# Patient Record
Sex: MALE | Race: WHITE | NOT HISPANIC OR LATINO | Employment: FULL TIME | URBAN - METROPOLITAN AREA
[De-identification: names, ages, dates, MRNs, and addresses within clinical notes are randomized per-mention and may not be internally consistent; named-entity substitution may affect disease eponyms.]

---

## 2023-10-03 ENCOUNTER — ANCILLARY PROCEDURE (OUTPATIENT)
Dept: RADIOLOGY | Facility: CLINIC | Age: 43
End: 2023-10-03
Payer: COMMERCIAL

## 2023-10-03 DIAGNOSIS — R94.31 ABNORMAL ELECTROCARDIOGRAM (ECG) (EKG): ICD-10-CM

## 2023-10-03 PROCEDURE — 75571 CT HRT W/O DYE W/CA TEST: CPT

## 2023-10-06 PROBLEM — E78.5 HLD (HYPERLIPIDEMIA): Status: ACTIVE | Noted: 2023-10-06

## 2023-10-06 PROBLEM — G47.30 SLEEP APNEA: Status: ACTIVE | Noted: 2023-10-06

## 2023-10-06 PROBLEM — L60.3 ONYCHODYSTROPHY: Status: ACTIVE | Noted: 2023-10-06

## 2023-10-06 PROBLEM — D22.62 MELANOCYTIC NEVI OF LEFT UPPER LIMB, INCLUDING SHOULDER: Status: ACTIVE | Noted: 2023-01-23

## 2023-10-06 PROBLEM — N20.0 NEPHROLITHIASIS: Status: ACTIVE | Noted: 2023-10-06

## 2023-10-06 PROBLEM — D22.70 MELANOCYTIC NEVI OF UNSPECIFIED LOWER LIMB, INCLUDING HIP: Status: ACTIVE | Noted: 2023-01-23

## 2023-10-06 PROBLEM — H91.90 HEARING LOSS: Status: ACTIVE | Noted: 2023-10-06

## 2023-10-06 PROBLEM — R43.8 HYPOSMIA: Status: ACTIVE | Noted: 2023-10-06

## 2023-10-06 PROBLEM — J34.829 NASAL VALVE COLLAPSE: Status: ACTIVE | Noted: 2023-10-06

## 2023-10-06 PROBLEM — L91.8 OTHER HYPERTROPHIC DISORDERS OF THE SKIN: Status: ACTIVE | Noted: 2023-01-23

## 2023-10-06 PROBLEM — R29.898 HEAVINESS OF UPPER EXTREMITY: Status: ACTIVE | Noted: 2023-10-06

## 2023-10-06 PROBLEM — F41.9 ANXIETY AND DEPRESSION: Status: ACTIVE | Noted: 2023-10-06

## 2023-10-06 PROBLEM — J34.2 NASAL SEPTAL DEVIATION: Status: ACTIVE | Noted: 2023-10-06

## 2023-10-06 PROBLEM — R03.0 ELEVATED BLOOD PRESSURE READING: Status: ACTIVE | Noted: 2023-10-06

## 2023-10-06 PROBLEM — J34.3 NASAL TURBINATE HYPERTROPHY: Status: ACTIVE | Noted: 2023-10-06

## 2023-10-06 PROBLEM — L21.9 SEBORRHEIC DERMATITIS, UNSPECIFIED: Status: ACTIVE | Noted: 2023-01-23

## 2023-10-06 PROBLEM — L82.1 OTHER SEBORRHEIC KERATOSIS: Status: ACTIVE | Noted: 2023-01-23

## 2023-10-06 PROBLEM — D48.5 NEOPLASM OF UNCERTAIN BEHAVIOR OF SKIN: Status: ACTIVE | Noted: 2023-01-23

## 2023-10-06 PROBLEM — R00.0 TACHYCARDIA: Status: ACTIVE | Noted: 2023-10-06

## 2023-10-06 PROBLEM — L90.5 SCAR CONDITION AND FIBROSIS OF SKIN: Status: ACTIVE | Noted: 2023-01-23

## 2023-10-06 PROBLEM — L81.4 OTHER MELANIN HYPERPIGMENTATION: Status: ACTIVE | Noted: 2023-01-23

## 2023-10-06 PROBLEM — E66.9 CLASS 1 OBESITY WITH BODY MASS INDEX (BMI) OF 30.0 TO 30.9 IN ADULT: Status: ACTIVE | Noted: 2023-10-06

## 2023-10-06 PROBLEM — D18.01 HEMANGIOMA OF SKIN AND SUBCUTANEOUS TISSUE: Status: ACTIVE | Noted: 2023-01-23

## 2023-10-06 PROBLEM — R60.0: Status: ACTIVE | Noted: 2023-10-06

## 2023-10-06 PROBLEM — E55.9 VITAMIN D DEFICIENCY: Status: ACTIVE | Noted: 2023-10-06

## 2023-10-06 PROBLEM — E66.811 CLASS 1 OBESITY WITH BODY MASS INDEX (BMI) OF 30.0 TO 30.9 IN ADULT: Status: ACTIVE | Noted: 2023-10-06

## 2023-10-06 PROBLEM — R22.9 SKIN NODULE: Status: ACTIVE | Noted: 2023-10-06

## 2023-10-06 PROBLEM — R41.3 MEMORY LOSS: Status: ACTIVE | Noted: 2023-10-06

## 2023-10-06 PROBLEM — F43.23 ADJUSTMENT DISORDER WITH MIXED ANXIETY AND DEPRESSED MOOD: Status: ACTIVE | Noted: 2023-10-06

## 2023-10-06 PROBLEM — E29.1 MALE HYPOGONADISM: Status: ACTIVE | Noted: 2023-10-06

## 2023-10-06 PROBLEM — M95.0 NASAL VALVE COLLAPSE: Status: ACTIVE | Noted: 2023-10-06

## 2023-10-06 PROBLEM — D22.5 MELANOCYTIC NEVI OF TRUNK: Status: ACTIVE | Noted: 2023-01-23

## 2023-10-06 PROBLEM — F32.A ANXIETY AND DEPRESSION: Status: ACTIVE | Noted: 2023-10-06

## 2023-10-06 PROBLEM — L73.8 OTHER SPECIFIED FOLLICULAR DISORDERS: Status: ACTIVE | Noted: 2023-01-23

## 2023-10-06 PROBLEM — G47.00 INSOMNIA: Status: ACTIVE | Noted: 2023-10-06

## 2023-10-06 RX ORDER — LORAZEPAM 1 MG/1
1 TABLET ORAL 2 TIMES DAILY PRN
COMMUNITY
End: 2023-11-21 | Stop reason: SDUPTHER

## 2023-10-06 RX ORDER — NORTRIPTYLINE HYDROCHLORIDE 25 MG/1
1 CAPSULE ORAL DAILY
COMMUNITY
Start: 2021-03-01 | End: 2023-11-21 | Stop reason: WASHOUT

## 2023-10-06 RX ORDER — HYDROXYZINE PAMOATE 25 MG/1
CAPSULE ORAL
COMMUNITY
Start: 2022-10-11 | End: 2023-11-21 | Stop reason: WASHOUT

## 2023-10-06 RX ORDER — FLUOCINONIDE 0.5 MG/G
CREAM TOPICAL
COMMUNITY
Start: 2021-10-12

## 2023-10-06 RX ORDER — KETOCONAZOLE 20 MG/G
CREAM TOPICAL
COMMUNITY
Start: 2021-07-12

## 2023-10-06 RX ORDER — SERTRALINE HYDROCHLORIDE 50 MG/1
2 TABLET, FILM COATED ORAL DAILY
COMMUNITY
Start: 2022-10-11

## 2023-10-06 RX ORDER — NORTRIPTYLINE HYDROCHLORIDE 10 MG/1
CAPSULE ORAL
COMMUNITY
Start: 2023-06-19

## 2023-10-06 RX ORDER — HYDROXYZINE HYDROCHLORIDE 25 MG/1
TABLET, FILM COATED ORAL
COMMUNITY
Start: 2023-06-19

## 2023-10-06 RX ORDER — FLUTICASONE PROPIONATE 50 MCG
2 SPRAY, SUSPENSION (ML) NASAL DAILY
COMMUNITY
Start: 2021-08-26

## 2023-10-06 RX ORDER — TIZANIDINE 4 MG/1
TABLET ORAL
COMMUNITY

## 2023-10-06 RX ORDER — CLINDAMYCIN PHOSPHATE 10 UG/ML
LOTION TOPICAL
COMMUNITY
Start: 2023-01-23

## 2023-10-06 RX ORDER — ASPIRIN 325 MG
TABLET, DELAYED RELEASE (ENTERIC COATED) ORAL
COMMUNITY
Start: 2021-05-05

## 2023-10-09 ENCOUNTER — OFFICE VISIT (OUTPATIENT)
Dept: CARDIOLOGY | Facility: CLINIC | Age: 43
End: 2023-10-09
Payer: COMMERCIAL

## 2023-10-09 ENCOUNTER — LAB (OUTPATIENT)
Dept: LAB | Facility: LAB | Age: 43
End: 2023-10-09
Payer: COMMERCIAL

## 2023-10-09 VITALS
TEMPERATURE: 96.4 F | HEIGHT: 69 IN | WEIGHT: 208 LBS | SYSTOLIC BLOOD PRESSURE: 138 MMHG | DIASTOLIC BLOOD PRESSURE: 88 MMHG | HEART RATE: 75 BPM | BODY MASS INDEX: 30.81 KG/M2

## 2023-10-09 DIAGNOSIS — E78.5 HYPERLIPIDEMIA, UNSPECIFIED: ICD-10-CM

## 2023-10-09 DIAGNOSIS — E66.09 CLASS 1 OBESITY DUE TO EXCESS CALORIES WITH BODY MASS INDEX (BMI) OF 30.0 TO 30.9 IN ADULT, UNSPECIFIED WHETHER SERIOUS COMORBIDITY PRESENT: ICD-10-CM

## 2023-10-09 DIAGNOSIS — Z78.9 NONSMOKER: ICD-10-CM

## 2023-10-09 DIAGNOSIS — F41.9 ANXIETY DISORDER, UNSPECIFIED: ICD-10-CM

## 2023-10-09 DIAGNOSIS — R03.0 BORDERLINE HYPERTENSION: ICD-10-CM

## 2023-10-09 DIAGNOSIS — E78.00 ELEVATED LDL CHOLESTEROL LEVEL: Primary | ICD-10-CM

## 2023-10-09 DIAGNOSIS — E66.9 OBESITY, UNSPECIFIED: ICD-10-CM

## 2023-10-09 DIAGNOSIS — R00.0 TACHYCARDIA: ICD-10-CM

## 2023-10-09 DIAGNOSIS — E78.2 MIXED HYPERLIPIDEMIA: Primary | ICD-10-CM

## 2023-10-09 DIAGNOSIS — R93.1 AGATSTON CAC SCORE 200-399: ICD-10-CM

## 2023-10-09 DIAGNOSIS — Z00.00 ENCOUNTER FOR GENERAL ADULT MEDICAL EXAMINATION WITHOUT ABNORMAL FINDINGS: Primary | ICD-10-CM

## 2023-10-09 LAB
ALBUMIN SERPL BCP-MCNC: 4.6 G/DL (ref 3.4–5)
ALP SERPL-CCNC: 72 U/L (ref 33–120)
ALT SERPL W P-5'-P-CCNC: 31 U/L (ref 10–52)
ANION GAP SERPL CALC-SCNC: 11 MMOL/L (ref 10–20)
AST SERPL W P-5'-P-CCNC: 25 U/L (ref 9–39)
BASOPHILS # BLD AUTO: 0.03 X10*3/UL (ref 0–0.1)
BASOPHILS NFR BLD AUTO: 0.4 %
BILIRUB SERPL-MCNC: 0.8 MG/DL (ref 0–1.2)
BUN SERPL-MCNC: 19 MG/DL (ref 6–23)
CALCIUM SERPL-MCNC: 9.7 MG/DL (ref 8.6–10.3)
CHLORIDE SERPL-SCNC: 103 MMOL/L (ref 98–107)
CHOLEST SERPL-MCNC: 247 MG/DL (ref 0–199)
CHOLESTEROL/HDL RATIO: 6.6
CO2 SERPL-SCNC: 30 MMOL/L (ref 21–32)
CREAT SERPL-MCNC: 1.23 MG/DL (ref 0.5–1.3)
EOSINOPHIL # BLD AUTO: 0.13 X10*3/UL (ref 0–0.7)
EOSINOPHIL NFR BLD AUTO: 1.6 %
ERYTHROCYTE [DISTWIDTH] IN BLOOD BY AUTOMATED COUNT: 12.3 % (ref 11.5–14.5)
EST. AVERAGE GLUCOSE BLD GHB EST-MCNC: 100 MG/DL
GFR SERPL CREATININE-BSD FRML MDRD: 74 ML/MIN/1.73M*2
GLUCOSE SERPL-MCNC: 111 MG/DL (ref 74–99)
HBA1C MFR BLD: 5.1 %
HCT VFR BLD AUTO: 46.2 % (ref 41–52)
HDLC SERPL-MCNC: 37.2 MG/DL
HGB BLD-MCNC: 15.1 G/DL (ref 13.5–17.5)
IMM GRANULOCYTES # BLD AUTO: 0.03 X10*3/UL (ref 0–0.7)
IMM GRANULOCYTES NFR BLD AUTO: 0.4 % (ref 0–0.9)
LDLC SERPL CALC-MCNC: 167 MG/DL (ref 140–190)
LYMPHOCYTES # BLD AUTO: 1.92 X10*3/UL (ref 1.2–4.8)
LYMPHOCYTES NFR BLD AUTO: 24.1 %
MCH RBC QN AUTO: 27 PG (ref 26–34)
MCHC RBC AUTO-ENTMCNC: 32.7 G/DL (ref 32–36)
MCV RBC AUTO: 83 FL (ref 80–100)
MONOCYTES # BLD AUTO: 0.63 X10*3/UL (ref 0.1–1)
MONOCYTES NFR BLD AUTO: 7.9 %
NEUTROPHILS # BLD AUTO: 5.23 X10*3/UL (ref 1.2–7.7)
NEUTROPHILS NFR BLD AUTO: 65.6 %
NON HDL CHOLESTEROL: 210 MG/DL (ref 0–149)
NRBC BLD-RTO: 0 /100 WBCS (ref 0–0)
PLATELET # BLD AUTO: 187 X10*3/UL (ref 150–450)
PMV BLD AUTO: 11.6 FL (ref 7.5–11.5)
POTASSIUM SERPL-SCNC: 4 MMOL/L (ref 3.5–5.3)
PROT SERPL-MCNC: 6.9 G/DL (ref 6.4–8.2)
PSA SERPL-MCNC: 0.59 NG/ML
RBC # BLD AUTO: 5.6 X10*6/UL (ref 4.5–5.9)
SODIUM SERPL-SCNC: 140 MMOL/L (ref 136–145)
TRIGL SERPL-MCNC: 215 MG/DL (ref 0–149)
TSH SERPL-ACNC: 2.21 MIU/L (ref 0.44–3.98)
VLDL: 43 MG/DL (ref 0–40)
WBC # BLD AUTO: 8 X10*3/UL (ref 4.4–11.3)

## 2023-10-09 PROCEDURE — 85025 COMPLETE CBC W/AUTO DIFF WBC: CPT

## 2023-10-09 PROCEDURE — 84153 ASSAY OF PSA TOTAL: CPT

## 2023-10-09 PROCEDURE — 3008F BODY MASS INDEX DOCD: CPT | Performed by: INTERNAL MEDICINE

## 2023-10-09 PROCEDURE — 1036F TOBACCO NON-USER: CPT | Performed by: INTERNAL MEDICINE

## 2023-10-09 PROCEDURE — 36415 COLL VENOUS BLD VENIPUNCTURE: CPT

## 2023-10-09 PROCEDURE — 83036 HEMOGLOBIN GLYCOSYLATED A1C: CPT

## 2023-10-09 PROCEDURE — 80053 COMPREHEN METABOLIC PANEL: CPT

## 2023-10-09 PROCEDURE — 80061 LIPID PANEL: CPT

## 2023-10-09 PROCEDURE — 84443 ASSAY THYROID STIM HORMONE: CPT

## 2023-10-09 PROCEDURE — 99213 OFFICE O/P EST LOW 20 MIN: CPT | Performed by: INTERNAL MEDICINE

## 2023-10-09 RX ORDER — ATORVASTATIN CALCIUM 40 MG/1
40 TABLET, FILM COATED ORAL DAILY
Qty: 90 TABLET | Refills: 3 | Status: SHIPPED | OUTPATIENT
Start: 2023-10-09 | End: 2024-10-08

## 2023-10-09 ASSESSMENT — PATIENT HEALTH QUESTIONNAIRE - PHQ9
SUM OF ALL RESPONSES TO PHQ9 QUESTIONS 1 AND 2: 0
1. LITTLE INTEREST OR PLEASURE IN DOING THINGS: NOT AT ALL
2. FEELING DOWN, DEPRESSED OR HOPELESS: NOT AT ALL

## 2023-10-09 NOTE — PROGRESS NOTES
Patient:  Vladislav Gilliam  YOB: 1980  MRN: [id number]       Chief Complaint/Active Symptoms:       Vladislav Gilliam is a 43 y.o. male who returns today for cardiac follow-up.    The patient is doing well. He returns to discuss results of his testing. Has nno angina. Is aware of an overall increased heart beat but no syncope or near syncope.     Answered questions related to diet, exercise, CV risk. Reviewed result of testing and what that means for treatment and future CV risk.       Objective:     Vitals:    10/09/23 1501   BP: 138/88   Pulse: 75   Temp: 35.8 °C (96.4 °F)       Vitals:    10/09/23 1501   Weight: 94.3 kg (208 lb)       Allergies:     No Known Allergies       Medications:     Current Outpatient Medications   Medication Instructions    atorvastatin (LIPITOR) 40 mg, oral, Daily    cholecalciferol (Vitamin D-3) 1,250 mcg (50,000 unit) capsule 1 cap every 14 days    clindamycin (Cleocin T) 1 % lotion 1 Application    fluocinonide 0.05 % cream 1 Application    fluticasone (Flonase) 50 mcg/actuation nasal spray 2 sprays, nasal, Daily    hydrOXYzine HCL (Atarax) 25 mg tablet TAKE 1 TO 2 TABLETS BY MOUTH EVERY 6 HOURS AS NEEDED FOR ANXIETY.    hydrOXYzine pamoate (Vistaril) 25 mg capsule TAKE 1 TO 2 CAPSULES EVERY 6 HOURS AS NEEDED FOR ANXIETY.    ketoconazole (NIZOral) 2 % cream 1 Application    LORazepam (ATIVAN) 1 mg, oral, 2 times daily PRN    nortriptyline (Pamelor) 10 mg capsule TAKE 2 CAPSULES BY MOUTH AT 9 PM    nortriptyline (Pamelor) 25 mg capsule 1 capsule, oral, Daily    sertraline (Zoloft) 50 mg tablet 2 tablets, oral, Daily    tiZANidine (Zanaflex) 4 mg tablet TAKE 1 TAB BY MOUTH AT BEDTIME       Physical Examination:   GENERAL:  Well developed, well nourished, in no acute distress.  NECK:  no JVD  CHEST:  Symmetric   LUNGS:  , normal respiratory effort.  NEURO/PSYCH:  Alert and oriented with normal gait and ambulation  Skin: no rash or lesions on exposed skin or  reported.    Lab:     CBC:   Lab Results   Component Value Date    WBC 8.0 10/09/2023    RBC 5.60 10/09/2023    HGB 15.1 10/09/2023    HCT 46.2 10/09/2023     10/09/2023        CMP:    Lab Results   Component Value Date     10/09/2023    K 4.0 10/09/2023     10/09/2023    CO2 30 10/09/2023    BUN 19 10/09/2023    CREATININE 1.23 10/09/2023    GLUCOSE 111 (H) 10/09/2023    CALCIUM 9.7 10/09/2023       Magnesium:    Lab Results   Component Value Date    MG 2.14 09/19/2023       Lipid Profile:    Lab Results   Component Value Date    TRIG 215 (H) 10/09/2023    HDL 37.2 10/09/2023    LDLCALC 167 10/09/2023       TSH:    Lab Results   Component Value Date    TSH 2.21 10/09/2023       BNP:   Lab Results   Component Value Date    BNP 33 09/19/2023        PT/INR:    Lab Results   Component Value Date    PROTIME 12.0 09/19/2023    INR 1.1 09/19/2023       HgBA1c:    Lab Results   Component Value Date    HGBA1C 5.1 03/04/2022       BMP:  Lab Results   Component Value Date     10/09/2023     09/19/2023     03/04/2022     11/04/2021    K 4.0 10/09/2023    K 4.2 09/19/2023    K 3.7 03/04/2022    K 4.2 11/04/2021     10/09/2023     09/19/2023     03/04/2022     11/04/2021    CO2 30 10/09/2023    CO2 28 09/19/2023    CO2 29 03/04/2022    CO2 30 11/04/2021    BUN 19 10/09/2023    BUN 21 09/19/2023    BUN 16 03/04/2022    BUN 22 11/04/2021    CREATININE 1.23 10/09/2023    CREATININE 1.12 09/19/2023    CREATININE 1.14 03/04/2022    CREATININE 1.02 11/04/2021       CBC:  Lab Results   Component Value Date    WBC 8.0 10/09/2023    WBC 11.7 (H) 09/19/2023    WBC 8.1 03/04/2022    WBC 9.1 11/04/2021    RBC 5.60 10/09/2023    RBC 5.67 09/19/2023    RBC 5.57 03/04/2022    RBC 5.77 11/04/2021    HGB 15.1 10/09/2023    HGB 15.5 09/19/2023    HGB 15.1 03/04/2022    HGB 16.3 11/04/2021    HCT 46.2 10/09/2023    HCT 46.9 09/19/2023    HCT 47.7 03/04/2022    HCT 48.3 11/04/2021  "   MCV 83 10/09/2023    MCV 83 09/19/2023    MCV 86 03/04/2022    MCV 84 11/04/2021    MCH 27.0 10/09/2023    MCHC 32.7 10/09/2023    MCHC 33.0 09/19/2023    MCHC 31.7 (L) 03/04/2022    MCHC 33.7 11/04/2021    RDW 12.3 10/09/2023    RDW 12.2 09/19/2023    RDW 12.3 03/04/2022    RDW 11.9 11/04/2021     10/09/2023     09/19/2023     03/04/2022     11/04/2021    MPV 11.6 (H) 10/09/2023       Cardiac Enzymes:    Lab Results   Component Value Date    TROPHS CANCELED 09/19/2023    TROPHS 4 09/19/2023    TROPHS 4 09/19/2023       Hepatic Function Panel:    Lab Results   Component Value Date    ALKPHOS 72 10/09/2023    ALT 31 10/09/2023    AST 25 10/09/2023    PROT 6.9 10/09/2023    BILITOT 0.8 10/09/2023         Diagnostic Studies:     Electrocardiogram 12 Lead    Result Date: 9/23/2023  Normal sinus rhythm Early repolarization Normal ECG When compared with ECG of 19-SEP-2023 17:52, (unconfirmed) No significant change was found Confirmed by Francine Carmichael ([id number]) on 9/23/2023 12:59:34 PM    Electrocardiogram 12 Lead    Result Date: 9/23/2023  Normal sinus rhythm ST elevation, consider early repolarization, pericarditis, or injury Nonspecific ST abnormality Abnormal ECG No previous ECGs available Confirmed by Francine Carmichael ([id number]) on 9/23/2023 12:55:29 PM    XR chest 1 view    Result Date: 9/19/2023  Interpreted By:  ERIC SHAFFER MD MRN: [id number] Patient Name: MIROSLAVA TADEO  STUDY: Chest, single AP view.  INDICATION: Chest Pain .  COMPARISON: None  ACCESSION NUMBER(S): [id number]  ORDERING CLINICIAN: PRIYANKA SERRANO  FINDINGS: The cardiac silhouette size is within normal limits. There is no focal consolidation, edema or pneumothorax. No sizeable pleural effusion. No acute osseous abnormality.      1. No acute cardiopulmonary process.      EKG:   No results found for: \"EKG\"      Radiology:     No orders to display       Assessment/Plan:     Diagnoses and all orders for this " visit:  Mixed hyperlipidemia  Agatston CAC score 200-399  Tachycardia  Borderline hypertension  Class 1 obesity due to excess calories with body mass index (BMI) of 30.0 to 30.9 in adult, unspecified whether serious comorbidity present      Patient Active Problem List   Diagnosis    Adjustment disorder with mixed anxiety and depressed mood    Anxiety and depression    Elevated blood pressure reading    Enlarged sublingual gland    Hearing loss    Hemangioma of skin and subcutaneous tissue    Other hypertrophic disorders of the skin    Other specified follicular disorders    Scar condition and fibrosis of skin    HLD (hyperlipidemia)    Hyposmia    Insomnia    Male hypogonadism    Melanocytic nevi of left upper limb, including shoulder    Melanocytic nevi of unspecified lower limb, including hip    Melanocytic nevi of trunk    Memory loss    Nasal septal deviation    Nasal turbinate hypertrophy    Nasal valve collapse    Neoplasm of uncertain behavior of skin    Nephrolithiasis    Onychodystrophy    Other melanin hyperpigmentation    Other seborrheic keratosis    Seborrheic dermatitis, unspecified    Skin nodule    Sleep apnea    Tachycardia    Vitamin D deficiency    Class 1 obesity with body mass index (BMI) of 30.0 to 30.9 in adult    Heaviness of upper extremity    Agatston CAC score 200-399         ASSESSMENT     Diagnoses and all orders for this visit:  Mixed hyperlipidemia  Agatston CAC score 200-399  Tachycardia  Borderline hypertension  Class 1 obesity due to excess calories with body mass index (BMI) of 30.0 to 30.9 in adult, unspecified whether serious comorbidity present      PLAN     1.  Coronary artery calcium score of 200-400.  This shows the patient has some calcified plaque suspect there is probably additional noncalcified plaque as well.  He has no angina and his CT cardiac score is not significantly elevated so would recommend risk factor modification at this time.  Statin therapy will be initiated  and was discussed with the patient as well as a heart healthy Mediterranean diet.  2.  Mixed hyperlipidemia.  Patient has both elevation of his VLDL and triglycerides as well as LDL cholesterol and low HDL cholesterol.  We spent some time discussing dietary changes and suggestion of a closer to a vegetarian diet with small amounts of lean animal protein and being cautious with the use of milk and milk products as they contain saturated fats.  After he is on the statin medication for 3 months he has orders to have a repeat lipid panel with transaminases.  We briefly reviewed some of the potential side effects from statin therapy and to contact us if he has any of these issues.  3.  Borderline hypertension.  His blood pressures remain borderline I suspect if he makes some of these changes and weight loss he may be able to sustain without medications but he will follow with his primary care physician over this next year and certainly if his blood pressures and [place] were to remain upward he should start on dedicated medications for the treatment of hypertension.  4.  Tachycardia.  He is aware of an increased heart rate at rest and with exercise.  He wore a monitor in the past that only showed sinus tachycardia.  I suspect some of this is due to physical deconditioning and a sedentary lifestyle.  We have talked about exercise and aerobic conditioning and that his heart rates will improve as he becomes more physically fit.  If he develops any abnormal symptoms he can return and we can consider a repeat monitor.  5.  Tobacco and weight status.  The patient is a non-smoker but borderline obese we have talked about a heart healthy weight reduction Mediterranean diet.    In the absence of any new symptoms we recommend the patient return for follow-up in a years time but to come earlier if he develops any new problems or concerns.

## 2023-11-03 DIAGNOSIS — Z12.11 COLON CANCER SCREENING: ICD-10-CM

## 2023-11-03 RX ORDER — SODIUM, POTASSIUM,MAG SULFATES 17.5-3.13G
1 SOLUTION, RECONSTITUTED, ORAL ORAL 2 TIMES DAILY
Qty: 2 EACH | Refills: 0 | Status: SHIPPED | OUTPATIENT
Start: 2023-11-03 | End: 2023-12-15 | Stop reason: ALTCHOICE

## 2023-11-14 DIAGNOSIS — F43.23 ADJUSTMENT DISORDER WITH MIXED ANXIETY AND DEPRESSED MOOD: ICD-10-CM

## 2023-11-15 RX ORDER — HYDROXYZINE HYDROCHLORIDE 25 MG/1
TABLET, FILM COATED ORAL
Qty: 100 TABLET | Refills: 2 | OUTPATIENT
Start: 2023-11-15

## 2023-11-21 ENCOUNTER — TELEMEDICINE (OUTPATIENT)
Dept: PRIMARY CARE | Facility: CLINIC | Age: 43
End: 2023-11-21
Payer: COMMERCIAL

## 2023-11-21 DIAGNOSIS — F43.23 ADJUSTMENT DISORDER WITH MIXED ANXIETY AND DEPRESSED MOOD: ICD-10-CM

## 2023-11-21 DIAGNOSIS — M79.642 LEFT HAND PAIN: Primary | ICD-10-CM

## 2023-11-21 DIAGNOSIS — E78.5 HYPERLIPIDEMIA, UNSPECIFIED HYPERLIPIDEMIA TYPE: ICD-10-CM

## 2023-11-21 DIAGNOSIS — M79.602 PAIN OF LEFT UPPER EXTREMITY: ICD-10-CM

## 2023-11-21 PROCEDURE — 99214 OFFICE O/P EST MOD 30 MIN: CPT | Performed by: INTERNAL MEDICINE

## 2023-11-21 RX ORDER — LORAZEPAM 0.5 MG/1
0.5 TABLET ORAL 2 TIMES DAILY PRN
Qty: 28 TABLET | Refills: 0 | Status: SHIPPED | OUTPATIENT
Start: 2023-11-21 | End: 2024-01-03

## 2023-11-21 NOTE — PROGRESS NOTES
"Assessment/Plan   Problem List Items Addressed This Visit       Adjustment disorder with mixed anxiety and depressed mood    Relevant Medications    LORazepam (Ativan) 0.5 mg tablet    HLD (hyperlipidemia)    Pain of left upper extremity - Primary    Relevant Orders    EMG & nerve conduction    Referral to [facility]   He works from [employer] and he agrees for EMG because it could well be carpal tunnel syndrome  Physical examination was not possible it was video related visit  He wanted refill of lorazepam for anxiety and is using very infrequently and therefore few pills has been given  He is otherwise much better and anxiety and depression and mood  He has been noted to have rising LDL and atorvastatin has been started  We discussed the side effect and follow-up blood work as well  He wanted acupuncture referral to the acupuncture has also been done    Subjective   Patient ID: Vladislav Gilliam \"Goyo" is a 43 y.o. male who presents for Follow-up.    Past Surgical History:   Procedure Laterality Date    OTHER SURGICAL HISTORY  05/04/2021    No history of surgery      Family History   Problem Relation Name Age of Onset    Colon cancer Mother      Aortic stenosis Father      Epilepsy Father        Social History     Socioeconomic History    Marital status:      Spouse name: Not on file    Number of children: Not on file    Years of education: Not on file    Highest education level: Not on file   Occupational History    Not on file   Tobacco Use    Smoking status: Never    Smokeless tobacco: Never   Substance and Sexual Activity    Alcohol use: Not Currently    Drug use: Never    Sexual activity: Not on file   Other Topics Concern    Not on file   Social History Narrative    Not on file     Social Determinants of Health     Financial Resource Strain: Not on file   Food Insecurity: Not on file   Transportation Needs: Not on file   Physical Activity: Not on file   Stress: Not on file   Social " Connections: Not on file   Intimate Partner Violence: Not on file   Housing Stability: Not on file      Patient has no known allergies.   Current Outpatient Medications   Medication Sig Dispense Refill    atorvastatin (Lipitor) 40 mg tablet Take 1 tablet (40 mg) by mouth once daily. 90 tablet 3    cholecalciferol (Vitamin D-3) 1,250 mcg (50,000 unit) capsule 1 cap every 14 days      clindamycin (Cleocin T) 1 % lotion 1 Application      fluocinonide 0.05 % cream 1 Application      fluticasone (Flonase) 50 mcg/actuation nasal spray Administer 2 sprays into affected nostril(s) once daily.      hydrOXYzine HCL (Atarax) 25 mg tablet TAKE 1 TO 2 TABLETS BY MOUTH EVERY 6 HOURS AS NEEDED FOR ANXIETY.      ketoconazole (NIZOral) 2 % cream 1 Application      nortriptyline (Pamelor) 10 mg capsule TAKE 2 CAPSULES BY MOUTH AT 9 PM      sertraline (Zoloft) 50 mg tablet Take 2 tablets (100 mg) by mouth once daily.      sodium,potassium,mag sulfates (Suprep) 17.5-3.13-1.6 gram recon soln solution Take 1 bottle by mouth 2 times a day. 2 each 0    tiZANidine (Zanaflex) 4 mg tablet TAKE 1 TAB BY MOUTH AT BEDTIME      LORazepam (Ativan) 0.5 mg tablet Take 1 tablet (0.5 mg) by mouth 2 times a day as needed for anxiety for up to 14 days. 28 tablet 0     No current facility-administered medications for this visit.      There were no vitals filed for this visit.   Problem List Items Addressed This Visit       Adjustment disorder with mixed anxiety and depressed mood    Relevant Medications    LORazepam (Ativan) 0.5 mg tablet    HLD (hyperlipidemia)    Pain of left upper extremity - Primary    Relevant Orders    EMG & nerve conduction    Referral to [facility]      Orders Placed This Encounter   Procedures    Referral to [facility]     Standing Status:   Future     Standing Expiration Date:   5/21/2024     Referral Priority:   Routine     Referral Type:   Consultation     Referral Reason:   Specialty Services Required      Number of Visits Requested:   1    EMG & nerve conduction     Standing Status:   Future     Standing Expiration Date:   11/21/2024     Scheduling Instructions:      At the time of the EMG appointment, the patient's skin should be clear of lotions, oils, or creams.  No other special preparations are required.  Patients can take all other medications as prescribed.  Please contact electromyography laboratory if patient is on cholinesterase inhibitor treatment.  T      here are no after effects and the patient can return to their usual activities immediately upon leaving the laboratory.  The results of the EMG examination are posted to the EHR, faxed and mailed to referring physician. If you have further questions, please call the Neuro EMG laboratory at [phone] for the  Core Labs:  [place] ( main campus), [facility], [facility], and [place].       For [place] call [phone].  Ask for Cardiology      For [place] call [phone].       For [place] call [phone]      For [place] call [phone]. Select option 3      For [place] call [phone].       For [place] call [phone].       For [place] call [phone].           Order Specific Question:   EMG Indication     Answer:   Carpal Tunnel Syndrome (CTS)     Order Specific Question:   Performing Entity     Answer:   [facility]     Order Specific Question:   Laterality     Answer:   Left     Order Specific Question:   Patient is on anticoagulant     Answer:   No     Order Specific Question:   Paitent is on anticholinesterase inhibitors     Answer:   No     Order Specific Question:   Patient has had prior spine surgery     Answer:   No     Order Specific Question:   Electrodiagnostic Physician to determine optimal study     Answer:   Yes     Order Specific Question:   Electrodiagnostic Physician to determine whether Neuromuscular Ultrasound to be performed for optimal study     Answer:   Yes     Order Specific Question:    Release result to [place]     Answer:   Immediate [1]        HPI  He presented for ongoing discomfort in the left upper extremity and left hand  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  He has no other symptoms  There is no chest pain no palpitation no shortness of breath no nausea vomiting diarrhea    ROS  As above  PHYSICAL EXAM  Examination limited by visual impression  No distress normal respiration affect is normal  Results for orders placed or performed in visit on 10/09/23   Lipid Panel   Result Value Ref Range    Cholesterol 247 (H) 0 - 199 mg/dL    HDL-Cholesterol 37.2 mg/dL    Cholesterol/HDL Ratio 6.6     LDL Calculated 167 140 - 190 mg/dL    VLDL 43 (H) 0 - 40 mg/dL    Triglycerides 215 (H) 0 - 149 mg/dL    Non HDL Cholesterol 210 (H) 0 - 149 mg/dL   CBC and Auto Differential   Result Value Ref Range    WBC 8.0 4.4 - 11.3 x10*3/uL    nRBC 0.0 0.0 - 0.0 /100 WBCs    RBC 5.60 4.50 - 5.90 x10*6/uL    Hemoglobin 15.1 13.5 - 17.5 g/dL    Hematocrit 46.2 41.0 - 52.0 %    MCV 83 80 - 100 fL    MCH 27.0 26.0 - 34.0 pg    MCHC 32.7 32.0 - 36.0 g/dL    RDW 12.3 11.5 - 14.5 %    Platelets 187 150 - 450 x10*3/uL    MPV 11.6 (H) 7.5 - 11.5 fL    Neutrophils % 65.6 40.0 - 80.0 %    Immature Granulocytes %, Automated 0.4 0.0 - 0.9 %    Lymphocytes % 24.1 13.0 - 44.0 %    Monocytes % 7.9 2.0 - 10.0 %    Eosinophils % 1.6 0.0 - 6.0 %    Basophils % 0.4 0.0 - 2.0 %    Neutrophils Absolute 5.23 1.20 - 7.70 x10*3/uL    Immature Granulocytes Absolute, Automated 0.03 0.00 - 0.70 x10*3/uL    Lymphocytes Absolute 1.92 1.20 - 4.80 x10*3/uL    Monocytes Absolute 0.63 0.10 - 1.00 x10*3/uL    Eosinophils Absolute 0.13 0.00 - 0.70 x10*3/uL    Basophils Absolute 0.03 0.00 - 0.10 x10*3/uL   Comprehensive Metabolic Panel   Result Value Ref Range    Glucose 111 (H) 74 - 99 mg/dL    Sodium 140 136 - 145 mmol/L    Potassium 4.0 3.5 - 5.3 mmol/L    Chloride 103 98 - 107 mmol/L    Bicarbonate 30 21 - 32 mmol/L    Anion Gap 11 10 - 20 mmol/L     "Urea Nitrogen 19 6 - 23 mg/dL    Creatinine 1.23 0.50 - 1.30 mg/dL    eGFR 74 >60 mL/min/1.73m*2    Calcium 9.7 8.6 - 10.3 mg/dL    Albumin 4.6 3.4 - 5.0 g/dL    Alkaline Phosphatase 72 33 - 120 U/L    Total Protein 6.9 6.4 - 8.2 g/dL    AST 25 9 - 39 U/L    Bilirubin, Total 0.8 0.0 - 1.2 mg/dL    ALT 31 10 - 52 U/L   Hemoglobin A1C   Result Value Ref Range    Hemoglobin A1C 5.1 see below %    Estimated Average Glucose 100 Not Established mg/dL   Prostate Specific Antigen, Screen   Result Value Ref Range    Prostate Specific Antigen,Screen 0.59 <=4.00 ng/mL   TSH with reflex to Free T4 if abnormal   Result Value Ref Range    Thyroid Stimulating Hormone 2.21 0.44 - 3.98 mIU/L           No results found for: \"PR1\", \"BMPR1A\", \"CMPLAS\", \"MV0POMIU\", \"KPSAT\"   Lab Results   Component Value Date    CHOL 247 (H) 10/09/2023    LDLCALC 167 10/09/2023    CHHDL 6.6 10/09/2023                "

## 2023-11-30 ENCOUNTER — ALLIED HEALTH (OUTPATIENT)
Dept: INTEGRATIVE MEDICINE | Facility: CLINIC | Age: 43
End: 2023-11-30
Payer: COMMERCIAL

## 2023-11-30 DIAGNOSIS — G89.29 CHRONIC PAIN OF LEFT UPPER EXTREMITY: Primary | ICD-10-CM

## 2023-11-30 DIAGNOSIS — M79.602 CHRONIC PAIN OF LEFT UPPER EXTREMITY: Primary | ICD-10-CM

## 2023-11-30 PROCEDURE — 97810 ACUP 1/> WO ESTIM 1ST 15 MIN: CPT | Performed by: ACUPUNCTURIST

## 2023-11-30 PROCEDURE — 97811 ACUP 1/> W/O ESTIM EA ADD 15: CPT | Performed by: ACUPUNCTURIST

## 2023-11-30 PROCEDURE — 99202 OFFICE O/P NEW SF 15 MIN: CPT | Performed by: ACUPUNCTURIST

## 2023-11-30 NOTE — PROGRESS NOTES
"Acupuncture Visit:     Subjective   Patient ID: Fide Gilliam is a 43 y.o. 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 He has tried heat for the symptoms. The treatment provided significant relief.       Session Information  Is this acupuncture treatment being billed to the patient's insurance company: Yes  Initial Acupuncture Treatment date: 11/30/23  Name of Insurance Company:  Employee Medical Plan  Visit Type: New patient  Medical History Reviewed: I have reviewed pertinent medical history in EHR, and no contraindications are present to provide treatment         Review of Systems         Provider reviewed plan for the acupuncture session, precautions and contraindications. Patient/guardian/hospital staff has given consent to treat with full understanding of what to expect during the session. Before acupuncture began, provider explained to the patient to communicate at any time if the procedure was causing discomfort past their tolerance level. Patient agreed to advise acupuncturist. The acupuncturist counseled the patient on the risks of acupuncture treatment including pain, infection, " "bleeding, and no relief of pain. The patient was positioned comfortably. There was no evidence of infection at the site of needle insertions.    Objective   Physical Exam    Acupuncture Physical Exam  Orthopedic Tests: \"heaviness\" with wrist flexion, \"tension\", with wrist extesion    Treatment Plan  Treatment Goals: Pain management, Wellbeing improvement    Acupuncture Treatment  Patient Position: Supine on a table  Needle Guage: 36 guage /.20/ Blue seirin, 30 guage /.30/ Brown seirin  Body Points - Left: GB20, upper trap, brachioradialis x3  Body Points - Bilateral: 4 Cabrera, KD7  Other Techniques Utilized: Topicals  Topicals Description: Posumon oil  Needle Count In: 11  Needle Count Out: 11  Needle Retention Time (min): 20 minutes  Total Face to Face Time (min): 25 minutes              Assessment/Plan       "

## 2023-12-07 ENCOUNTER — HOSPITAL ENCOUNTER (OUTPATIENT)
Dept: NEUROLOGY | Facility: CLINIC | Age: 43
Discharge: HOME | End: 2023-12-07
Payer: COMMERCIAL

## 2023-12-07 ENCOUNTER — APPOINTMENT (OUTPATIENT)
Dept: INTEGRATIVE MEDICINE | Facility: CLINIC | Age: 43
End: 2023-12-07
Payer: COMMERCIAL

## 2023-12-07 ENCOUNTER — ALLIED HEALTH (OUTPATIENT)
Dept: INTEGRATIVE MEDICINE | Facility: CLINIC | Age: 43
End: 2023-12-07
Payer: COMMERCIAL

## 2023-12-07 DIAGNOSIS — G89.29 CHRONIC PAIN OF LEFT UPPER EXTREMITY: Primary | ICD-10-CM

## 2023-12-07 DIAGNOSIS — M79.602 CHRONIC PAIN OF LEFT UPPER EXTREMITY: Primary | ICD-10-CM

## 2023-12-07 DIAGNOSIS — M79.642 LEFT HAND PAIN: ICD-10-CM

## 2023-12-07 DIAGNOSIS — M79.602 PAIN OF LEFT UPPER EXTREMITY: ICD-10-CM

## 2023-12-07 PROCEDURE — 95911 NRV CNDJ TEST 9-10 STUDIES: CPT | Performed by: SPECIALIST

## 2023-12-07 PROCEDURE — 95886 MUSC TEST DONE W/N TEST COMP: CPT | Performed by: SPECIALIST

## 2023-12-07 PROCEDURE — 97811 ACUP 1/> W/O ESTIM EA ADD 15: CPT | Performed by: ACUPUNCTURIST

## 2023-12-07 PROCEDURE — 97810 ACUP 1/> WO ESTIM 1ST 15 MIN: CPT | Performed by: ACUPUNCTURIST

## 2023-12-07 NOTE — PATIENT INSTRUCTIONS
Frequency of visits: Recommend begin with 6-8 treatments, 1x/week, then re-evaluate for maintenance. Treatment recommendation may change depending on the severity and/or duration of symptoms.    Diet/lifestyle suggestions: Drink enough water over the next few days; apply heat as directed to affected areas    Please feel free to contact me with any questions or concerns

## 2023-12-07 NOTE — PROGRESS NOTES
"Acupuncture Visit:     Subjective   Patient ID: Fide Gilliam is a 43 y.o. male who presents for Arm Pain and Wrist Pain  Patient stated that he had approximately 1 day of relief following his initial acupuncture treatment    Patient also had an EMG today, please refer to patient chart for test results      Initial intake:    Pt came in today seeking treatment for chronic forearm pain and \"heaviness\"    Patient stated that he began experiencing heaviness in his left forearm approximately 1 month ago; he was evaluated at the ED for cardiac issues, all tests came back clear    He stated that when he wakes in the morning he feels pain and heaviness from his medial elbow to his wrist which resolves after he takes a very hot shower; patient works from home and it is in front of a laptop for most of the day, also describing himself as having \"horrible\" posture; 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 He has tried heat for the symptoms. The treatment provided significant relief.       Session Information  Is this acupuncture treatment being billed to the patient's insurance company: Yes  Last Treatment date: 11/30/23  Name of Insurance Company:  Employee Medical Plan  Visit Type: Follow-up visit  Medical History Reviewed: I have reviewed pertinent medical history in EHR, and no contraindications are present to provide treatment         Review of Systems         Provider reviewed plan for the acupuncture session, precautions and contraindications. Patient/guardian/hospital staff has given consent to treat with full understanding of what to expect during the session. Before acupuncture began, provider explained to the patient to communicate at any time if the procedure was " causing discomfort past their tolerance level. Patient agreed to advise acupuncturist. The acupuncturist counseled the patient on the risks of acupuncture treatment including pain, infection, bleeding, and no relief of pain. The patient was positioned comfortably. There was no evidence of infection at the site of needle insertions.    Objective   Physical Exam                             Assessment/Plan

## 2023-12-08 ENCOUNTER — TELEPHONE (OUTPATIENT)
Dept: PRIMARY CARE | Facility: CLINIC | Age: 43
End: 2023-12-08
Payer: COMMERCIAL

## 2023-12-08 NOTE — TELEPHONE ENCOUNTER
----- Message from Claudia Pichardo MD sent at 12/7/2023  4:36 PM EST -----  Tell patient cervical radiculopathy   Need pt

## 2023-12-08 NOTE — TELEPHONE ENCOUNTER
Telephone call to patient, spoke to patient, made him aware of cervical radiculopathy and his need for physical therapy. He had no questions at this time.

## 2023-12-11 ENCOUNTER — ALLIED HEALTH (OUTPATIENT)
Dept: INTEGRATIVE MEDICINE | Facility: CLINIC | Age: 43
End: 2023-12-11
Payer: COMMERCIAL

## 2023-12-11 DIAGNOSIS — M79.602 CHRONIC PAIN OF LEFT UPPER EXTREMITY: Primary | ICD-10-CM

## 2023-12-11 DIAGNOSIS — G89.29 CHRONIC PAIN OF LEFT UPPER EXTREMITY: Primary | ICD-10-CM

## 2023-12-11 PROCEDURE — 97811 ACUP 1/> W/O ESTIM EA ADD 15: CPT | Performed by: ACUPUNCTURIST

## 2023-12-11 PROCEDURE — 97810 ACUP 1/> WO ESTIM 1ST 15 MIN: CPT | Performed by: ACUPUNCTURIST

## 2023-12-11 NOTE — PROGRESS NOTES
"Acupuncture Visit:     Please note: Dictation software was used while completing this note and may contain spelling, grammatical, and/or syntax errors.  Please contact me with any questions.    To clinicians, I am always happy to partner with you in the care of your patients. Do not hesitate to call the office or contact me directly regarding any further consultations or with questions regarding the plan of care outlined or patient progress.    Subjective   Patient ID: Fide Gilliam is a 43 y.o. male who presents for Arm Pain    Patient's EMG results showed cervical radiculopathy at L C7/C8/T1, his pain and heaviness sensations in his left arm overall manage the dermatome patterns of those areas; patient was referred to PT by his provider      Initial intake:    Pt came in today seeking treatment for chronic forearm pain and \"heaviness\"    Patient stated that he began experiencing heaviness in his left forearm approximately 1 month ago; he was evaluated at the ED for cardiac issues, all tests came back clear    He stated that when he wakes in the morning he feels pain and heaviness from his medial elbow to his wrist which resolves after he takes a very hot shower; patient works from home and it is in front of a laptop for most of the day, also describing himself as having \"horrible\" posture; 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 He has tried heat for the symptoms. The treatment provided significant relief.       Session Information  Is this acupuncture treatment being billed to the patient's insurance company: Yes  Last Treatment date: 12/07/23  Name of Insurance Company:  Employee Medical Plan  Visit Type: Follow-up visit  Medical History Reviewed: I have " reviewed pertinent medical history in EHR, and no contraindications are present to provide treatment         Review of Systems         Provider reviewed plan for the acupuncture session, precautions and contraindications. Patient/guardian/hospital staff has given consent to treat with full understanding of what to expect during the session. Before acupuncture began, provider explained to the patient to communicate at any time if the procedure was causing discomfort past their tolerance level. Patient agreed to advise acupuncturist. The acupuncturist counseled the patient on the risks of acupuncture treatment including pain, infection, bleeding, and no relief of pain. The patient was positioned comfortably. There was no evidence of infection at the site of needle insertions.    Objective   Physical Exam         Treatment Plan  Treatment Goals: Pain management, Wellbeing improvement    Acupuncture Treatment  Patient Position: Prone on a table  Needle Guage: 36 guage /.20/ Blue seirin, 30 guage /.30/ Brown seirin  Body Points - Left: HJJ C7, T1, T2, upper trap, levator, infraspinatus, brachioradialis, wrist flexor x1  Body Points - Bilateral: KD7, BL23, BL52, BL20, BL14, suboccipitals  Other Techniques Utilized: TDP Lamp, Topicals, Gua sha  Gua Sha Description: paraspinals  Topicals Description: Sports Massage oil  TDP Lamp Descripton: upper back, 20min  Needle Count In: 20  Needle Count Out: 20  Needle Retention Time (min): 20 minutes  Total Face to Face Time (min): 25 minutes              Assessment/Plan

## 2023-12-13 ENCOUNTER — ALLIED HEALTH (OUTPATIENT)
Dept: INTEGRATIVE MEDICINE | Facility: CLINIC | Age: 43
End: 2023-12-13
Payer: COMMERCIAL

## 2023-12-13 DIAGNOSIS — G89.29 CHRONIC PAIN OF LEFT UPPER EXTREMITY: Primary | ICD-10-CM

## 2023-12-13 DIAGNOSIS — M79.602 CHRONIC PAIN OF LEFT UPPER EXTREMITY: Primary | ICD-10-CM

## 2023-12-13 DIAGNOSIS — M54.2 CHRONIC NECK PAIN: ICD-10-CM

## 2023-12-13 DIAGNOSIS — G89.29 CHRONIC NECK PAIN: ICD-10-CM

## 2023-12-13 PROCEDURE — 97811 ACUP 1/> W/O ESTIM EA ADD 15: CPT | Performed by: ACUPUNCTURIST

## 2023-12-13 PROCEDURE — 97810 ACUP 1/> WO ESTIM 1ST 15 MIN: CPT | Performed by: ACUPUNCTURIST

## 2023-12-13 NOTE — PROGRESS NOTES
"Acupuncture Visit:     Please note: Dictation software was used while completing this note and may contain spelling, grammatical, and/or syntax errors.  Please contact me with any questions.    To clinicians, I am always happy to partner with you in the care of your patients. Do not hesitate to call the office or contact me directly regarding any further consultations or with questions regarding the plan of care outlined or patient progress.    Subjective   Patient ID: Fide Gilliam is a 43 y.o. male who presents for Arm Pain    Patient stated that he did not experience any of his typical pain symptoms for 2 days after his last visit; he also noted tension in his forearm with wrist extension, as well as tenderness at the lateral elbow/extensor attachments      Initial intake:    Pt came in today seeking treatment for chronic forearm pain and \"heaviness\"    Patient stated that he began experiencing heaviness in his left forearm approximately 1 month ago; he was evaluated at the ED for cardiac issues, all tests came back clear    He stated that when he wakes in the morning he feels pain and heaviness from his medial elbow to his wrist which resolves after he takes a very hot shower; patient works from home and it is in front of a laptop for most of the day, also describing himself as having \"horrible\" posture; 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 He has tried heat for the symptoms. The treatment provided significant relief.       Session Information  Is this acupuncture treatment being billed to the patient's insurance company: Yes  Last Treatment date: 12/11/23  Name of Insurance Company: [facility] Medical Plan  Visit Type: Follow-up visit  Medical History " Reviewed: I have reviewed pertinent medical history in EHR, and no contraindications are present to provide treatment         Review of Systems         Provider reviewed plan for the acupuncture session, precautions and contraindications. Patient/guardian/hospital staff has given consent to treat with full understanding of what to expect during the session. Before acupuncture began, provider explained to the patient to communicate at any time if the procedure was causing discomfort past their tolerance level. Patient agreed to advise acupuncturist. The acupuncturist counseled the patient on the risks of acupuncture treatment including pain, infection, bleeding, and no relief of pain. The patient was positioned comfortably. There was no evidence of infection at the site of needle insertions.    Objective   Physical Exam         Treatment Plan  Treatment Goals: Pain management, Wellbeing improvement    Acupuncture Treatment  Patient Position: Prone on a table  Needle Guage: 36 guage /.20/ Blue seirin, 30 guage /.30/ Brown seirin  Body Points - Left: HJJ C7, T1, T2, upper trap, levator, infraspinatus, brachioradialis, wrist extensors x1  Body Points - Bilateral: KD7, BL23, BL52, BL20, BL14  Other Techniques Utilized: TDP Lamp, Gua sha, Topicals  Gua Sha Description: L upper back/scapula  Topicals Description: Sports Massage oil  TDP Lamp Descripton: upper back, 20min  Needle Count In: 18  Needle Count Out: 18  Needle Retention Time (min): 20 minutes  Total Face to Face Time (min): 25 minutes              Assessment/Plan

## 2023-12-15 ENCOUNTER — ANESTHESIA EVENT (OUTPATIENT)
Dept: GASTROENTEROLOGY | Facility: EXTERNAL LOCATION | Age: 43
End: 2023-12-15

## 2023-12-15 ENCOUNTER — HOSPITAL ENCOUNTER (OUTPATIENT)
Dept: GASTROENTEROLOGY | Facility: EXTERNAL LOCATION | Age: 43
Discharge: HOME | End: 2023-12-15
Payer: COMMERCIAL

## 2023-12-15 ENCOUNTER — ANESTHESIA (OUTPATIENT)
Dept: GASTROENTEROLOGY | Facility: EXTERNAL LOCATION | Age: 43
End: 2023-12-15

## 2023-12-15 VITALS
OXYGEN SATURATION: 95 % | WEIGHT: 200 LBS | RESPIRATION RATE: 17 BRPM | SYSTOLIC BLOOD PRESSURE: 131 MMHG | HEIGHT: 68 IN | TEMPERATURE: 98.2 F | HEART RATE: 89 BPM | DIASTOLIC BLOOD PRESSURE: 88 MMHG | BODY MASS INDEX: 30.31 KG/M2

## 2023-12-15 DIAGNOSIS — Z12.11 ENCOUNTER FOR SCREENING FOR MALIGNANT NEOPLASM OF COLON: ICD-10-CM

## 2023-12-15 PROCEDURE — 45378 DIAGNOSTIC COLONOSCOPY: CPT | Performed by: INTERNAL MEDICINE

## 2023-12-15 RX ORDER — LIDOCAINE HYDROCHLORIDE 20 MG/ML
INJECTION, SOLUTION INFILTRATION; PERINEURAL AS NEEDED
Status: DISCONTINUED | OUTPATIENT
Start: 2023-12-15 | End: 2023-12-15

## 2023-12-15 RX ORDER — SODIUM CHLORIDE 9 MG/ML
20 INJECTION, SOLUTION INTRAVENOUS CONTINUOUS
Status: DISCONTINUED | OUTPATIENT
Start: 2023-12-15 | End: 2023-12-16 | Stop reason: HOSPADM

## 2023-12-15 RX ORDER — ONDANSETRON HYDROCHLORIDE 2 MG/ML
4 INJECTION, SOLUTION INTRAVENOUS ONCE AS NEEDED
Status: DISCONTINUED | OUTPATIENT
Start: 2023-12-15 | End: 2023-12-16 | Stop reason: HOSPADM

## 2023-12-15 RX ORDER — PROPOFOL 10 MG/ML
INJECTION, EMULSION INTRAVENOUS AS NEEDED
Status: DISCONTINUED | OUTPATIENT
Start: 2023-12-15 | End: 2023-12-15

## 2023-12-15 RX ADMIN — PROPOFOL 30 MG: 10 INJECTION, EMULSION INTRAVENOUS at 11:25

## 2023-12-15 RX ADMIN — LIDOCAINE HYDROCHLORIDE 2 ML: 20 INJECTION, SOLUTION INFILTRATION; PERINEURAL at 11:17

## 2023-12-15 RX ADMIN — PROPOFOL 50 MG: 10 INJECTION, EMULSION INTRAVENOUS at 11:21

## 2023-12-15 RX ADMIN — PROPOFOL 30 MG: 10 INJECTION, EMULSION INTRAVENOUS at 11:29

## 2023-12-15 RX ADMIN — SODIUM CHLORIDE: 9 INJECTION, SOLUTION INTRAVENOUS at 11:14

## 2023-12-15 RX ADMIN — PROPOFOL 30 MG: 10 INJECTION, EMULSION INTRAVENOUS at 11:23

## 2023-12-15 RX ADMIN — PROPOFOL 100 MG: 10 INJECTION, EMULSION INTRAVENOUS at 11:17

## 2023-12-15 RX ADMIN — PROPOFOL 30 MG: 10 INJECTION, EMULSION INTRAVENOUS at 11:27

## 2023-12-15 RX ADMIN — PROPOFOL 50 MG: 10 INJECTION, EMULSION INTRAVENOUS at 11:19

## 2023-12-15 RX ADMIN — PROPOFOL 30 MG: 10 INJECTION, EMULSION INTRAVENOUS at 11:31

## 2023-12-15 ASSESSMENT — PAIN SCALES - GENERAL
PAIN_LEVEL: 0
PAINLEVEL_OUTOF10: 0 - NO PAIN

## 2023-12-15 ASSESSMENT — PAIN - FUNCTIONAL ASSESSMENT
PAIN_FUNCTIONAL_ASSESSMENT: 0-10

## 2023-12-15 ASSESSMENT — COLUMBIA-SUICIDE SEVERITY RATING SCALE - C-SSRS
1. IN THE PAST MONTH, HAVE YOU WISHED YOU WERE DEAD OR WISHED YOU COULD GO TO SLEEP AND NOT WAKE UP?: NO
6. HAVE YOU EVER DONE ANYTHING, STARTED TO DO ANYTHING, OR PREPARED TO DO ANYTHING TO END YOUR LIFE?: NO
2. HAVE YOU ACTUALLY HAD ANY THOUGHTS OF KILLING YOURSELF?: NO

## 2023-12-15 NOTE — ANESTHESIA PREPROCEDURE EVALUATION
"Patient: Vladislav Gilliam \"Yev\"    Procedure Information       Date/Time: 12/15/23 1100    Scheduled providers: Nawaf Quintero MD; Kim Vasques RN    Procedure: COLONOSCOPY    Location: [facility]            Relevant Problems   Cardiovascular   (+) HLD (hyperlipidemia)      Endocrine   (+) Class 1 obesity with body mass index (BMI) of 30.0 to 30.9 in adult      /Renal   (+) Nephrolithiasis      Neuro/Psych   (+) Anxiety and depression      Eyes, Ears, Nose, and Throat   (+) Hearing loss       Clinical information reviewed:    Allergies  Meds               NPO Detail:  No data recorded     Physical Exam    Airway  Mallampati: II  TM distance: >3 FB  Neck ROM: full     Cardiovascular - normal exam     Dental - normal exam     Pulmonary - normal exam     Abdominal - normal exam           Anesthesia Plan    ASA 3     MAC     intravenous induction   Anesthetic plan and risks discussed with patient.  "

## 2023-12-15 NOTE — ANESTHESIA POSTPROCEDURE EVALUATION
"Patient: Vladislav Gilliam \"Yev\"    Procedure Summary       Date: 12/15/23 Room / Location: [facility]    Anesthesia Start: 1114 Anesthesia Stop: 1137    Procedure: COLONOSCOPY Diagnosis: Encounter for screening for malignant neoplasm of colon    Scheduled Providers: Nawaf Quintero MD; Kim Vasques RN Responsible Provider: YI He    Anesthesia Type: MAC ASA Status: 3            Anesthesia Type: MAC    Vitals Value Taken Time   /68 12/15/23 1136   Temp 36.8 °C (98.2 °F) 12/15/23 1136   Pulse 84 12/15/23 1136   Resp 13 12/15/23 1136   SpO2 97 % 12/15/23 1136       Anesthesia Post Evaluation    Patient location during evaluation: PACU  Patient participation: waiting for patient participation  Level of consciousness: responsive to physical stimuli  Pain score: 0  Pain management: adequate  Airway patency: patent  Cardiovascular status: blood pressure returned to baseline  Respiratory status: acceptable  Hydration status: acceptable  Postoperative Nausea and Vomiting: none        No notable events documented.    "

## 2023-12-15 NOTE — DISCHARGE INSTRUCTIONS
Patient Instructions Post Procedure      The anesthetics, sedatives or narcotics which were given to you today will be acting in your body for the next 24 hours, so you might feel a little sleepy or groggy.  This feeling should slowly wear off. Carefully read and follow the instructions.     You received sedation today:  - Do not drive or operate any machinery or power tools of any kind.   - No alcoholic beverages today, not even beer or wine.  - Do not make any important decisions or sign any legal documents.  - No over the counter medications that contain alcohol or that may cause drowsiness.    While it is common to experience mild to moderate abdominal distention, gas, or belching after your procedure, if any of these symptoms occur following discharge from the GI Lab or within one week of having your procedure, call the [facility] to be advised whether a visit to your nearest Urgent Care or Emergency Department is indicated.  Take this paper with you if you go.   - If you develop an allergic reaction to the medications that were given during your procedure such as difficulty breathing, rash, hives, severe nausea, vomiting or lightheadedness.  - If you experience chest pain, shortness of breath, severe abdominal pain, fevers and chills.  -If you develop signs and symptoms of bleeding such as blood in your spit, if your stools turn black, tarry, or bloody  - If you have not urinated within 8 hours following your procedure.  - If your IV site becomes painful, red, inflamed, or looks infected.    If you received a biopsy/polypectomy/sphincterotomy the following instructions apply below:  __ Do not use Aspirin containing products, non-steroidal medications or anti-coagulants for one week following your procedure. (Examples of these types of medications are: Advil, Arthrotec, Aleve, Coumadin, Ecotrin, Heparin, Ibuprofen, Indocin, Motrin, Naprosyn, Nuprin, Plavix, Vioxx, and Voltarin, or their generic  forms.  This list is not all-inclusive.  Check with your physician or pharmacist before resuming medications.)   __ Eat a soft diet today.  Avoid foods that are poorly digested for the next 24 hours.  These foods would include: nuts, beans, lettuce, red meats, and fried foods. Start with liquids and advance your diet as tolerated, gradually work up to eating solids.   __ Do not have a Barium Study or Enema for one week.    Your physician recommends the additional following instructions:    -You have a contact number available for emergencies. The signs and symptoms of potential delayed complications were discussed with you. You may return to normal activities tomorrow.  -Resume your previous diet or other if specified.  -Continue your present medications.   -We are waiting for your pathology results, if applicable.  -The findings and recommendations have been discussed with you and/or family.  - Please see Medication Reconciliation Form for new medication/medications prescribed.     If you experience any problems or have any questions following discharge from the GI Lab, please call: [phone] from 7 am- 4:30 pm.  In the event of an emergency please go to the closest Emergency Department or call Dr. Quintero @ [phone]

## 2023-12-15 NOTE — H&P
Outpatient Hospital Procedure H&P    Patient Profile-Procedures  Initial Info  Patient Demographics  Name Vladislav Gilliam  Date of Birth 1980  MRN [id number]  Address   [address] [place] [id number] [place] [address]    Primary Phone Number [phone]  Secondary Phone Number    Claudia Jesus    Procedure(s):  Colonoscopy  Primary contact name and number   Extended Emergency Contact Information  Primary Emergency Contact: Kellie Gilliam  Address: [address] [place], [place] [address] [facility]  Home Phone: [phone]  Mobile Phone: [phone]  Relation: Spouse    General Health  Weight   Vitals:    12/15/23 1052   Weight: 90.7 kg (200 lb)     BMI Body mass index is 30.41 kg/m².    Allergies  No Known Allergies    Past Medical History   Past Medical History:   Diagnosis Date    Anxiety     Hyperlipidemia     Personal history of other diseases of the circulatory system     History of essential hypertension    Personal history of urinary calculi 05/04/2021    History of nephrolithiasis    Unspecified chronic otitis externa, unspecified ear 06/15/2021    Chronic otitis externa       Provider assessment  Diagnosis:  CRC    Medication Reviewed - yes  Prior to Admission medications    Medication Sig Start Date End Date Taking? Authorizing Provider   atorvastatin (Lipitor) 40 mg tablet Take 1 tablet (40 mg) by mouth once daily. 10/9/23 10/8/24 Yes Francine Carmichael MD   cholecalciferol (Vitamin D-3) 1,250 mcg (50,000 unit) capsule 1 cap every 14 days 5/5/21  Yes Historical Provider, MD   clindamycin (Cleocin T) 1 % lotion 1 Application 1/23/23  Yes Historical Provider, MD   fluocinonide 0.05 % cream 1 Application 10/12/21  Yes Historical Provider, MD   fluticasone (Flonase) 50 mcg/actuation nasal spray Administer 2 sprays into affected nostril(s) once daily. 8/26/21  Yes Historical Provider, MD   hydrOXYzine HCL (Atarax) 25 mg tablet TAKE 1 TO 2 TABLETS  BY MOUTH EVERY 6 HOURS AS NEEDED FOR ANXIETY. 6/19/23  Yes Historical Provider, MD   ketoconazole (NIZOral) 2 % cream 1 Application 7/12/21  Yes Historical Provider, MD   nortriptyline (Pamelor) 10 mg capsule TAKE 2 CAPSULES BY MOUTH AT 9 PM 6/19/23  Yes Historical Provider, MD   sertraline (Zoloft) 50 mg tablet Take 2 tablets (100 mg) by mouth once daily. 10/11/22  Yes Historical Provider, MD   tiZANidine (Zanaflex) 4 mg tablet TAKE 1 TAB BY MOUTH AT BEDTIME   Yes Historical Provider, MD   sodium,potassium,mag sulfates (Suprep) 17.5-3.13-1.6 gram recon soln solution Take 1 bottle by mouth 2 times a day. 11/3/23 12/15/23 Yes Nawaf Quintero MD   LORazepam (Ativan) 0.5 mg tablet Take 1 tablet (0.5 mg) by mouth 2 times a day as needed for anxiety for up to 14 days. 11/21/23 12/5/23  Claudia Pichardo MD       Physical Exam  Vitals:    12/15/23 1052   BP: 144/86   Pulse: 73   Resp: 14   Temp: 36.5 °C (97.7 °F)   SpO2: 98%        General: A&Ox3, NAD.  HEENT: AT/NC.   CV: RRR. No murmur.  Resp: CTA bilaterally. No wheezing, rhonchi or rales.   GI: Soft, NT/ND. BSx4.  Extrem: No edema. Pulses intact.  Skin: No Jaundice.   Neuro: No focal deficits.   Psych: Normal mood and affect.        Oropharyngeal Classification II (hard and soft palate, upper portion of tonsils anduvula visible)  ASA PS Classification 2  Sedation Plan Deep  Procedure Plan - pre-procedural (re)assesment completed by physician:  discharge/transfer patient when discharge criteria met    Nawaf Quintero MD  12/15/2023 11:13 AM

## 2023-12-18 ENCOUNTER — ALLIED HEALTH (OUTPATIENT)
Dept: INTEGRATIVE MEDICINE | Facility: CLINIC | Age: 43
End: 2023-12-18
Payer: COMMERCIAL

## 2023-12-18 DIAGNOSIS — M79.602 CHRONIC PAIN OF LEFT UPPER EXTREMITY: Primary | ICD-10-CM

## 2023-12-18 DIAGNOSIS — G89.29 CHRONIC NECK PAIN: ICD-10-CM

## 2023-12-18 DIAGNOSIS — M54.2 CHRONIC NECK PAIN: ICD-10-CM

## 2023-12-18 DIAGNOSIS — G89.29 CHRONIC PAIN OF LEFT UPPER EXTREMITY: Primary | ICD-10-CM

## 2023-12-18 PROCEDURE — 97810 ACUP 1/> WO ESTIM 1ST 15 MIN: CPT | Performed by: ACUPUNCTURIST

## 2023-12-18 PROCEDURE — 97811 ACUP 1/> W/O ESTIM EA ADD 15: CPT | Performed by: ACUPUNCTURIST

## 2023-12-18 NOTE — ADDENDUM NOTE
Encounter addended by: Luz Dumont RN on: 12/18/2023 8:13 AM   Actions taken: Contacts section saved, Flowsheet accepted

## 2023-12-18 NOTE — PROGRESS NOTES
"Acupuncture Visit:     Please note: Dictation software was used while completing this note and may contain spelling, grammatical, and/or syntax errors.  Please contact me with any questions.    To clinicians, I am always happy to partner with you in the care of your patients. Do not hesitate to call the office or contact me directly regarding any further consultations or with questions regarding the plan of care outlined or patient progress.    Subjective   Patient ID: Fide Gilliam is a 43 y.o. male who presents for Arm Pain    Patient stated that he felt good after his last treatment, noting that he rested in his car for approximately 15 minutes before heading home    He also noted \"different pain\" in his forearm last night; he again noted that his pain does not bother him until he wakes up, calling it \"transitional\"      Initial intake:    Pt came in today seeking treatment for chronic forearm pain and \"heaviness\"    Patient stated that he began experiencing heaviness in his left forearm approximately 1 month ago; he was evaluated at the ED for cardiac issues, all tests came back clear    He stated that when he wakes in the morning he feels pain and heaviness from his medial elbow to his wrist which resolves after he takes a very hot shower; patient works from home and it is in front of a laptop for most of the day, also describing himself as having \"horrible\" posture; 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 He has tried heat for the symptoms. The treatment provided significant relief.       Session Information  Is this acupuncture treatment being billed to the patient's insurance company: Yes  Last Treatment date: 12/13/23  Name of Insurance Company:  Employee " Medical Plan  Visit Type: Follow-up visit  Medical History Reviewed: I have reviewed pertinent medical history in EHR, and no contraindications are present to provide treatment         Review of Systems         Provider reviewed plan for the acupuncture session, precautions and contraindications. Patient/guardian/hospital staff has given consent to treat with full understanding of what to expect during the session. Before acupuncture began, provider explained to the patient to communicate at any time if the procedure was causing discomfort past their tolerance level. Patient agreed to advise acupuncturist. The acupuncturist counseled the patient on the risks of acupuncture treatment including pain, infection, bleeding, and no relief of pain. The patient was positioned comfortably. There was no evidence of infection at the site of needle insertions.    Objective   Physical Exam         Treatment Plan  Treatment Goals: Pain management, Wellbeing improvement                   Assessment/Plan

## 2023-12-21 ENCOUNTER — ALLIED HEALTH (OUTPATIENT)
Dept: INTEGRATIVE MEDICINE | Facility: CLINIC | Age: 43
End: 2023-12-21
Payer: COMMERCIAL

## 2023-12-21 DIAGNOSIS — M54.2 CHRONIC NECK PAIN: Primary | ICD-10-CM

## 2023-12-21 DIAGNOSIS — M79.602 CHRONIC PAIN OF LEFT UPPER EXTREMITY: ICD-10-CM

## 2023-12-21 DIAGNOSIS — G89.29 CHRONIC PAIN OF LEFT UPPER EXTREMITY: ICD-10-CM

## 2023-12-21 DIAGNOSIS — G89.29 CHRONIC NECK PAIN: Primary | ICD-10-CM

## 2023-12-21 PROCEDURE — 97811 ACUP 1/> W/O ESTIM EA ADD 15: CPT | Performed by: ACUPUNCTURIST

## 2023-12-21 PROCEDURE — 97810 ACUP 1/> WO ESTIM 1ST 15 MIN: CPT | Performed by: ACUPUNCTURIST

## 2023-12-21 NOTE — PROGRESS NOTES
"Acupuncture Visit:     Please note: Dictation software was used while completing this note and may contain spelling, grammatical, and/or syntax errors.  Please contact me with any questions.    To clinicians, I am always happy to partner with you in the care of your patients. Do not hesitate to call the office or contact me directly regarding any further consultations or with questions regarding the plan of care outlined or patient progress.    Subjective   Patient ID: Fide Gilliam is a 43 y.o. male who presents for Arm Pain    Patient stated that he feels good and generally sleeps well for approximately 1-2 nights post acupuncture    He noted bilateral wrist pain while he was typing on his laptop late last night; he admits to poor posture when the pain came on      Initial intake:    Pt came in today seeking treatment for chronic forearm pain and \"heaviness\"    Patient stated that he began experiencing heaviness in his left forearm approximately 1 month ago; he was evaluated at the ED for cardiac issues, all tests came back clear    He stated that when he wakes in the morning he feels pain and heaviness from his medial elbow to his wrist which resolves after he takes a very hot shower; patient works from home and it is in front of a laptop for most of the day, also describing himself as having \"horrible\" posture; 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 He has tried heat for the symptoms. The treatment provided significant relief.       Session Information  Is this acupuncture treatment being billed to the patient's insurance company: Yes  Last Treatment date: 12/18/23  Name of Insurance Company:  Employee Medical Plan  Visit Type: Follow-up visit  Medical History " Reviewed: I have reviewed pertinent medical history in EHR, and no contraindications are present to provide treatment         Review of Systems         Provider reviewed plan for the acupuncture session, precautions and contraindications. Patient/guardian/hospital staff has given consent to treat with full understanding of what to expect during the session. Before acupuncture began, provider explained to the patient to communicate at any time if the procedure was causing discomfort past their tolerance level. Patient agreed to advise acupuncturist. The acupuncturist counseled the patient on the risks of acupuncture treatment including pain, infection, bleeding, and no relief of pain. The patient was positioned comfortably. There was no evidence of infection at the site of needle insertions.    Objective   Physical Exam         Treatment Plan  Treatment Goals: Pain management, Wellbeing improvement                   Assessment/Plan

## 2023-12-21 NOTE — PATIENT INSTRUCTIONS
Frequency of visits: Continue as needed for symptom maintenance    Diet/lifestyle suggestions: Drink enough water over the next few days; apply heat as directed to affected areas    Please feel free to contact me with any questions or concerns

## 2023-12-28 DIAGNOSIS — F43.23 ADJUSTMENT DISORDER WITH MIXED ANXIETY AND DEPRESSED MOOD: ICD-10-CM

## 2024-01-03 RX ORDER — LORAZEPAM 0.5 MG/1
0.5 TABLET ORAL 2 TIMES DAILY PRN
Qty: 28 TABLET | Refills: 0 | Status: SHIPPED | OUTPATIENT
Start: 2024-01-03 | End: 2024-01-17

## 2024-07-06 ENCOUNTER — APPOINTMENT (OUTPATIENT)
Dept: BEHAVIORAL HEALTH | Facility: CLINIC | Age: 44
End: 2024-07-06
Payer: COMMERCIAL

## 2024-08-04 DIAGNOSIS — F41.9 ANXIETY AND DEPRESSION: ICD-10-CM

## 2024-08-04 DIAGNOSIS — F32.A ANXIETY AND DEPRESSION: ICD-10-CM

## 2024-08-05 RX ORDER — HYDROXYZINE PAMOATE 25 MG/1
25 CAPSULE ORAL 4 TIMES DAILY PRN
Qty: 120 CAPSULE | Refills: 0 | Status: SHIPPED | OUTPATIENT
Start: 2024-08-05 | End: 2024-09-04

## 2024-09-17 DIAGNOSIS — E78.00 ELEVATED LDL CHOLESTEROL LEVEL: ICD-10-CM

## 2024-09-23 ENCOUNTER — APPOINTMENT (OUTPATIENT)
Dept: DERMATOLOGY | Facility: CLINIC | Age: 44
End: 2024-09-23
Payer: COMMERCIAL

## 2024-09-23 DIAGNOSIS — D22.9 MELANOCYTIC NEVUS, UNSPECIFIED LOCATION: ICD-10-CM

## 2024-09-23 DIAGNOSIS — L81.4 LENTIGO: ICD-10-CM

## 2024-09-23 DIAGNOSIS — L90.5 SCAR CONDITIONS AND FIBROSIS OF SKIN: ICD-10-CM

## 2024-09-23 DIAGNOSIS — L73.9 FOLLICULITIS: Primary | ICD-10-CM

## 2024-09-23 DIAGNOSIS — Z12.83 ENCOUNTER FOR SCREENING FOR MALIGNANT NEOPLASM OF SKIN: ICD-10-CM

## 2024-09-23 DIAGNOSIS — L82.1 SEBORRHEIC KERATOSIS: ICD-10-CM

## 2024-09-23 DIAGNOSIS — D18.00 HEMANGIOMA, UNSPECIFIED SITE: ICD-10-CM

## 2024-09-23 PROCEDURE — 99214 OFFICE O/P EST MOD 30 MIN: CPT | Performed by: DERMATOLOGY

## 2024-09-23 RX ORDER — CLOBETASOL PROPIONATE 0.5 MG/ML
SOLUTION TOPICAL 2 TIMES DAILY
Qty: 50 ML | Refills: 3 | Status: SHIPPED | OUTPATIENT
Start: 2024-09-23 | End: 2024-10-07

## 2024-09-23 NOTE — PROGRESS NOTES
Subjective     Fide Gilliam is a 44 y.o. male who presents for the following: Skin Check (annual).     Skin Cancer Screening  He has a history of moderate sun exposure. He is in the sun infrequently. He uses sunscreen never. He reports itching and burning (left ear and left nostril). His moles are not changing.    Spots that concern him: right axilla (itching, 6 months, stopped antiperspirants,  OTC body wash) forehead and back of neck (itching, 1+ years, patient has been using Nizoral OTC shampoo with no improvement for 2 months daily)    Patient has a history of:   DN - 10/12/21  SK    Review of Systems:  No other skin or systemic complaints other than what is documented elsewhere in the note.    The following portions of the chart were reviewed this encounter and updated as appropriate:       Skin Cancer History  No skin cancer on file.    Specialty Problems          Dermatology Problems    Hemangioma of skin and subcutaneous tissue    Melanocytic nevi of left upper limb, including shoulder    Melanocytic nevi of trunk    Melanocytic nevi of unspecified lower limb, including hip    Neoplasm of uncertain behavior of skin    Other hypertrophic disorders of the skin    Other melanin hyperpigmentation    Other seborrheic keratosis    Other specified follicular disorders    Scar condition and fibrosis of skin    Seborrheic dermatitis, unspecified    Onychodystrophy     Past Medical History:  Fide Gilliam  has a past medical history of Anxiety, Hyperlipidemia, Personal history of other diseases of the circulatory system, Personal history of urinary calculi (05/04/2021), and Unspecified chronic otitis externa, unspecified ear (06/15/2021).    Past Surgical History:  Fide Gilliam  has a past surgical history that includes Other surgical history (05/04/2021).    Family History:  Patient family history includes Aortic stenosis in his father; Colon cancer in his mother; Epilepsy in his father.       Objective   Well  appearing patient in no apparent distress; mood and affect are within normal limits.    A full examination was performed including scalp, head, eyes, ears, nose, lips, neck, chest, axillae, abdomen, back, buttocks, bilateral upper extremities, bilateral lower extremities, hands, feet, fingers, toes, fingernails, and toenails. All findings within normal limits unless otherwise noted below.    Assessment/Plan   1. Folliculitis decalvans    2. Scar conditions and fibrosis of skin  Scar is well-healed without evidence of recurrence    3. Hemangioma, unspecified site  Scattered cherry-red papule(s).    4. Seborrheic keratosis  Stuck on verrucous, tan-brown papules and plaques.      5. Folliculitis  Scalp  Follicularly-based erythematous papules and pustules.    The nature of the diagnosis was explained to patient. Will plan to treat with clobetasol solution BID x 1-2 weeks then PRN for flares. Can also use to affected areas in nose/ear sparingly. Risks, benefits, side effects, alternatives and options were discussed with patient and the patient voiced understanding. Pt agrees with plan as above and will call if continuing to have issues.       Related Medications  clobetasol (Temovate) 0.05 % external solution  Apply topically 2 times a day for 14 days.    6. Melanocytic nevus, unspecified location  All nevi were symmetric brown macules without atypia on dermoscopy.     The ABCDEs of melanoma and warning signs of non-melanoma skin cancer were discussed with patient and patient expressed understanding. Sun protection and use of at least SPF 30 discussed with patient. Pt instructed to reapply every 2 hours.     7. Lentigo  Scattered tan macules in sun-exposed areas.    The ABCDEs of melanoma and warning signs of non-melanoma skin cancer were discussed with patient and patient expressed understanding. Sun protection and use of at least SPF 30 discussed with patient. Pt instructed to reapply every 2 hours.        Follow up  in 1 year or sooner as needed.

## 2024-09-24 RX ORDER — ATORVASTATIN CALCIUM 40 MG/1
40 TABLET, FILM COATED ORAL DAILY
Qty: 90 TABLET | Refills: 1 | Status: SHIPPED | OUTPATIENT
Start: 2024-09-24 | End: 2025-03-23

## 2024-09-30 DIAGNOSIS — F41.9 ANXIETY AND DEPRESSION: ICD-10-CM

## 2024-09-30 DIAGNOSIS — F32.A ANXIETY AND DEPRESSION: ICD-10-CM

## 2024-10-01 ENCOUNTER — APPOINTMENT (OUTPATIENT)
Dept: CARDIOLOGY | Facility: CLINIC | Age: 44
End: 2024-10-01
Payer: COMMERCIAL

## 2024-10-01 ENCOUNTER — APPOINTMENT (OUTPATIENT)
Dept: PRIMARY CARE | Facility: CLINIC | Age: 44
End: 2024-10-01
Payer: COMMERCIAL

## 2024-10-01 VITALS
HEIGHT: 68 IN | BODY MASS INDEX: 31.07 KG/M2 | SYSTOLIC BLOOD PRESSURE: 122 MMHG | WEIGHT: 205 LBS | HEART RATE: 76 BPM | DIASTOLIC BLOOD PRESSURE: 88 MMHG

## 2024-10-01 VITALS
HEART RATE: 109 BPM | BODY MASS INDEX: 31.07 KG/M2 | WEIGHT: 205 LBS | HEIGHT: 68 IN | SYSTOLIC BLOOD PRESSURE: 124 MMHG | DIASTOLIC BLOOD PRESSURE: 92 MMHG

## 2024-10-01 DIAGNOSIS — F32.A ANXIETY AND DEPRESSION: ICD-10-CM

## 2024-10-01 DIAGNOSIS — R06.02 SHORTNESS OF BREATH ON EXERTION: ICD-10-CM

## 2024-10-01 DIAGNOSIS — R00.0 SINUS TACHYCARDIA: ICD-10-CM

## 2024-10-01 DIAGNOSIS — E78.2 MIXED HYPERLIPIDEMIA: ICD-10-CM

## 2024-10-01 DIAGNOSIS — R03.0 BORDERLINE HYPERTENSION: ICD-10-CM

## 2024-10-01 DIAGNOSIS — R93.1 AGATSTON CAC SCORE 200-399: Primary | ICD-10-CM

## 2024-10-01 DIAGNOSIS — H91.93 BILATERAL HEARING LOSS, UNSPECIFIED HEARING LOSS TYPE: ICD-10-CM

## 2024-10-01 DIAGNOSIS — E55.9 VITAMIN D DEFICIENCY: ICD-10-CM

## 2024-10-01 DIAGNOSIS — Z12.5 ENCOUNTER FOR SCREENING FOR MALIGNANT NEOPLASM OF PROSTATE: ICD-10-CM

## 2024-10-01 DIAGNOSIS — Z00.00 ROUTINE GENERAL MEDICAL EXAMINATION AT A HEALTH CARE FACILITY: Primary | ICD-10-CM

## 2024-10-01 DIAGNOSIS — F41.9 ANXIETY AND DEPRESSION: ICD-10-CM

## 2024-10-01 DIAGNOSIS — Z23 NEED FOR INFLUENZA VACCINATION: ICD-10-CM

## 2024-10-01 DIAGNOSIS — R93.1 AGATSTON CAC SCORE 200-399: ICD-10-CM

## 2024-10-01 PROBLEM — R94.31 ABNORMAL ELECTROCARDIOGRAPHY: Status: ACTIVE | Noted: 2021-07-12

## 2024-10-01 PROBLEM — Z86.79 HISTORY OF HYPERTENSION: Status: ACTIVE | Noted: 2024-10-01

## 2024-10-01 PROCEDURE — 93000 ELECTROCARDIOGRAM COMPLETE: CPT | Performed by: INTERNAL MEDICINE

## 2024-10-01 PROCEDURE — 90656 IIV3 VACC NO PRSV 0.5 ML IM: CPT | Performed by: INTERNAL MEDICINE

## 2024-10-01 PROCEDURE — 99214 OFFICE O/P EST MOD 30 MIN: CPT | Performed by: INTERNAL MEDICINE

## 2024-10-01 PROCEDURE — 3008F BODY MASS INDEX DOCD: CPT | Performed by: INTERNAL MEDICINE

## 2024-10-01 PROCEDURE — 99396 PREV VISIT EST AGE 40-64: CPT | Performed by: INTERNAL MEDICINE

## 2024-10-01 PROCEDURE — 90471 IMMUNIZATION ADMIN: CPT | Performed by: INTERNAL MEDICINE

## 2024-10-01 PROCEDURE — 1036F TOBACCO NON-USER: CPT | Performed by: INTERNAL MEDICINE

## 2024-10-01 RX ORDER — LORAZEPAM 0.5 MG/1
0.5 TABLET ORAL 2 TIMES DAILY PRN
Qty: 60 TABLET | Refills: 0 | Status: SHIPPED | OUTPATIENT
Start: 2024-10-01

## 2024-10-01 ASSESSMENT — ENCOUNTER SYMPTOMS
SHORTNESS OF BREATH: 1
PALPITATIONS: 1
DIZZINESS: 0
NERVOUS/ANXIOUS: 1
FATIGUE: 1

## 2024-10-01 ASSESSMENT — PATIENT HEALTH QUESTIONNAIRE - PHQ9
1. LITTLE INTEREST OR PLEASURE IN DOING THINGS: NOT AT ALL
SUM OF ALL RESPONSES TO PHQ9 QUESTIONS 1 AND 2: 0
2. FEELING DOWN, DEPRESSED OR HOPELESS: NOT AT ALL

## 2024-10-01 NOTE — PROGRESS NOTES
"Assessment/Plan   Problem List Items Addressed This Visit       Anxiety and depression    Relevant Orders    Vitamin B12    Hearing loss    Relevant Orders    Referral to Audiology    HLD (hyperlipidemia)    Sinus tachycardia    Vitamin D deficiency    Relevant Orders    Vitamin D 25-Hydroxy,Total (for eval of Vitamin D levels)    Agatston CAC score 200-399    Shortness of breath on exertion    Routine general medical examination at a health care facility - Primary    Relevant Orders    Vitamin B12    Need for influenza vaccination    Relevant Orders    Flu vaccine, trivalent, preservative free, age 6 months and greater (Fluraix/Fluzone/Flulaval) (Completed)    BMI 31.0-31.9,adult     Other Visit Diagnoses       Encounter for screening for malignant neoplasm of prostate        Relevant Orders    Prostate Specific Antigen        Discussed all these conditions  Labs as ordered  Follow-up after the labs to be consider  Diet and exercise for weight loss  There is undergoing anxiety in view of the illness of his wife and the treatment that she is undergoing    Subjective   Patient ID: Vladislav Gliliam \"Goyo" is a 44 y.o. male who presents for Annual Exam (Patient would like to discuss heart rate readings.  States that in the evening his heart rate is elevated.  ).    Past Surgical History:   Procedure Laterality Date    OTHER SURGICAL HISTORY  05/04/2021    No history of surgery      Family History   Problem Relation Name Age of Onset    Colon cancer Mother      Aortic stenosis Father      Epilepsy Father        Social History     Socioeconomic History    Marital status:      Spouse name: Not on file    Number of children: Not on file    Years of education: Not on file    Highest education level: Not on file   Occupational History    Not on file   Tobacco Use    Smoking status: Never    Smokeless tobacco: Never   Vaping Use    Vaping status: Not on file   Substance and Sexual Activity    Alcohol use: Never    " Drug use: Never    Sexual activity: Yes     Partners: Female     Birth control/protection: Condom Male   Other Topics Concern    Not on file   Social History Narrative    Not on file     Social Determinants of Health     Financial Resource Strain: Not on File (3/19/2021)    Received from [facility]    Financial Resource Strain     Financial Resource Strain: 0   Food Insecurity: Not on File (3/19/2021)    Received from [facility]    Food Insecurity     Food: 0   Transportation Needs: Not on File (3/19/2021)    Received from [facility]    Transportation Needs     Transportation: 0   Physical Activity: Not on File (3/19/2021)    Received from [facility]    Physical Activity     Physical Activity: 0   Stress: Not on File (3/19/2021)    Received from [facility]    Stress     Stress: 0   Social Connections: Not on File (3/19/2021)    Received from [facility]    Social Connections     Social Connections and Isolation: 0   Intimate Partner Violence: Not on file   Housing Stability: Not on File (3/19/2021)    Received from [facility]    Housing Stability     Housin      Patient has no known allergies.   Current Outpatient Medications   Medication Sig Dispense Refill    atorvastatin (Lipitor) 40 mg tablet Take 1 tablet (40 mg) by mouth once daily. 90 tablet 1    clobetasol (Temovate) 0.05 % external solution Apply topically 2 times a day for 14 days. 50 mL 3    escitalopram (Lexapro) 10 mg tablet Take 1 tablet (10 mg) by mouth once daily. 30 tablet 2    hydrOXYzine pamoate (Vistaril) 25 mg capsule TAKE 1 CAPSULE (25 MG) BY MOUTH 4 TIMES A DAY AS NEEDED FOR ITCHING OR ANXIETY. 120 capsule 0    LORazepam (Ativan) 0.5 mg tablet Take 1 tablet (0.5 mg) by mouth 2 times a day as needed for anxiety. 60 tablet 0    nortriptyline (Pamelor) 10 mg capsule TAKE 2 CAPSULES BY MOUTH AT 9 PM      tiZANidine (Zanaflex) 4 mg tablet Take 1 tablet (4 mg) by mouth once daily at bedtime. 90 tablet 1     No current  "facility-administered medications for this visit.      Vitals:    10/01/24 1641   BP: (!) 124/92   BP Location: Left arm   Patient Position: Sitting   Pulse: 109   Weight: 93 kg (205 lb)   Height: 1.727 m (5' 8\")      Problem List Items Addressed This Visit       Anxiety and depression    Relevant Orders    Vitamin B12    Hearing loss    Relevant Orders    Referral to Audiology    HLD (hyperlipidemia)    Sinus tachycardia    Vitamin D deficiency    Relevant Orders    Vitamin D 25-Hydroxy,Total (for eval of Vitamin D levels)    Agatston CAC score 200-399    Shortness of breath on exertion    Routine general medical examination at a health care facility - Primary    Relevant Orders    Vitamin B12    Need for influenza vaccination    Relevant Orders    Flu vaccine, trivalent, preservative free, age 6 months and greater (Fluraix/Fluzone/Flulaval) (Completed)    BMI 31.0-31.9,adult     Other Visit Diagnoses       Encounter for screening for malignant neoplasm of prostate        Relevant Orders    Prostate Specific Antigen           Orders Placed This Encounter   Procedures    Flu vaccine, trivalent, preservative free, age 6 months and greater (Fluraix/Fluzone/Flulaval)    Prostate Specific Antigen     Standing Status:   Future     Standing Expiration Date:   10/1/2025     Order Specific Question:   Release result to [facility]     Answer:   Immediate    Vitamin D 25-Hydroxy,Total (for eval of Vitamin D levels)     Standing Status:   Future     Standing Expiration Date:   10/1/2025     Order Specific Question:   Release result to MyChart     Answer:   Immediate [1]    Vitamin B12     Standing Status:   Future     Standing Expiration Date:   10/1/2025     Order Specific Question:   Release result to [facility]     Answer:   Immediate [1]    Referral to Audiology     Standing Status:   Future     Standing Expiration Date:   10/1/2025     Referral Priority:   Routine     Referral Type:   Consultation     Referral Reason:   " "Specialty Services Required     Requested Specialty:   Audiology     Number of Visits Requested:   1        HPI  This was annual wellness exam nevertheless he had multiple issues as well  Mother had colon cancer melanoma and thyroid nodule which is being worked out  He had colonoscopy examination which was okay  He has abnormal sweating sometimes  He is concerned about vitamin D deficiency that he had  He is getting medication from the psychiatrist for anxiety and depression and is having abnormal heart rate at the end of the day with elevated blood pressure  He saw the cardiologist further testing is being planned  He has some shortness of breath on exertion  He had elevated cholesterol and he is on a statin but follow-up blood test has not been done but has been ordered now     ROS as mentioned above otherwise negative  Additionally he mentioned that he had some hearing deficit and something that he had better something not therefore audiology consultation is requested  Past medical history reviewed  Social and family history reviewed  Allergies and medications reviewed  Recent labs reviewed  Vital signs reviewed    PHYSICAL EXAM  Cardiovascular chest abdominal neurological examination normal      No results found for: \"PR1\", \"BMPR1A\", \"CMPLAS\", \"CK9FKRKE\", \"KPSAT\"   Lab Results   Component Value Date    CHOL 247 (H) 10/09/2023    LDLCALC 167 10/09/2023    CHHDL 6.6 10/09/2023                "

## 2024-10-01 NOTE — PROGRESS NOTES
Patient:  Vladislav Gilliam  YOB: 1980  MRN: [id number]       Chief Complaint/Active Symptoms:       Vladislav Gilliam is a 44 y.o. male who returns today for cardiac follow-up.    Is here for annual follow-up. Increased stress as wife is undergoing cancer treatment.     Physically he is fine but has noted that his heart rate is normal in the morning but throughout the remainder of the day even seated his HR is 115 bpm. HR goes up as soon as he wakes up from 60 to 95 without getting out of bed.       Review of Systems   Constitutional:  Positive for fatigue.   HENT:  Positive for congestion.    Respiratory:  Positive for shortness of breath.    Cardiovascular:  Positive for palpitations. Negative for chest pain.   Neurological:  Negative for dizziness.   Psychiatric/Behavioral:  The patient is nervous/anxious.    All other systems reviewed and are negative.      Objective:     Vitals:    10/01/24 0938   BP: 122/88   Pulse: 76       Vitals:    10/01/24 0938   Weight: 93 kg (205 lb)       Allergies:     No Known Allergies       Medications:     Current Outpatient Medications   Medication Instructions    atorvastatin (LIPITOR) 40 mg, oral, Daily    clobetasol (Temovate) 0.05 % external solution Topical, 2 times daily    escitalopram (LEXAPRO) 10 mg, oral, Daily    hydrOXYzine pamoate (VISTARIL) 25 mg, oral, 4 times daily PRN    LORazepam (ATIVAN) 0.5 mg, oral, 2 times daily PRN    nortriptyline (Pamelor) 10 mg capsule TAKE 2 CAPSULES BY MOUTH AT 9 PM    tiZANidine (ZANAFLEX) 4 mg, oral, Nightly       Physical Examination:   GENERAL:  Well developed, well nourished, in no acute distress.  HEENT: NC AT, EOMI with anicteric sclera  NECK:  Supple, no JVD, no bruit.  CHEST:  Symmetric and nontender.  LUNGS:  Clear to auscultation bilaterally, normal respiratory effort.  HEART:  PMI is nondisplaced. RRR with normal S1 and S2, no S3, no mumur or rub. No carotid or abdominal bruits  ABDOMEN: Soft, NT, ND  without palpable organomegaly or bruits  EXTREMITIES:  Warm with good color, no clubbing or cyanosis.  There is no edema noted.  PERIPHERAL VASCULAR:  Pulses present and equally palpable; 2+ throughout.  MUSCULOSKELETAL: No significant joint or limb deformity  NEURO/PSYCH:  Alert and oriented times three with approppriate behavior and responses. Nonfocal motor examination with normal gait and ambulation  Lymph: No significant palpable lymphadenopathy  Skin: no rash or lesions on exposed skin or reported.    Lab:     CBC:   Lab Results   Component Value Date    WBC 8.0 10/09/2023    RBC 5.60 10/09/2023    HGB 15.1 10/09/2023    HCT 46.2 10/09/2023     10/09/2023        CMP:    Lab Results   Component Value Date     10/09/2023    K 4.0 10/09/2023     10/09/2023    CO2 30 10/09/2023    BUN 19 10/09/2023    CREATININE 1.23 10/09/2023    GLUCOSE 111 (H) 10/09/2023    CALCIUM 9.7 10/09/2023       Magnesium:    Lab Results   Component Value Date    MG 2.14 09/19/2023       Lipid Profile:    Lab Results   Component Value Date    TRIG 215 (H) 10/09/2023    HDL 37.2 10/09/2023    LDLCALC 167 10/09/2023       TSH:    Lab Results   Component Value Date    TSH 2.21 10/09/2023       BNP:   Lab Results   Component Value Date    BNP 33 09/19/2023        PT/INR:    Lab Results   Component Value Date    PROTIME 12.0 09/19/2023    INR 1.1 09/19/2023       HgBA1c:    Lab Results   Component Value Date    HGBA1C 5.1 10/09/2023       BMP:  Lab Results   Component Value Date     10/09/2023     09/19/2023     03/04/2022     11/04/2021    K 4.0 10/09/2023    K 4.2 09/19/2023    K 3.7 03/04/2022    K 4.2 11/04/2021     10/09/2023     09/19/2023     03/04/2022     11/04/2021    CO2 30 10/09/2023    CO2 28 09/19/2023    CO2 29 03/04/2022    CO2 30 11/04/2021    BUN 19 10/09/2023    BUN 21 09/19/2023    BUN 16 03/04/2022    BUN 22 11/04/2021    CREATININE 1.23 10/09/2023     CREATININE 1.12 09/19/2023    CREATININE 1.14 03/04/2022    CREATININE 1.02 11/04/2021       Cardiac Enzymes:    Lab Results   Component Value Date    TROPHS CANCELED 09/19/2023    TROPHS 4 09/19/2023    TROPHS 4 09/19/2023       Hepatic Function Panel:    Lab Results   Component Value Date    ALKPHOS 72 10/09/2023    ALT 31 10/09/2023    AST 25 10/09/2023    PROT 6.9 10/09/2023    BILITOT 0.8 10/09/2023         Diagnostic Studies:     EKG:   EKG today shows normal sinus rhythm at 70 bpm normal EKG    Radiology:     No orders to display       ASSESSMENT     Problem List Items Addressed This Visit       Borderline hypertension    Relevant Orders    ECG 12 lead (Clinic Performed)    Comprehensive Metabolic Panel    HLD (hyperlipidemia)    Relevant Orders    Comprehensive Metabolic Panel    Lipid Panel    Sinus tachycardia    Relevant Orders    TSH with reflex to Free T4 if abnormal    Holter Or Event Cardiac Monitor    Agatston CAC score 200-399 - Primary    Relevant Orders    Comprehensive Metabolic Panel    Lipid Panel    Echocardiogram Stress Test    Shortness of breath on exertion    Relevant Orders    CBC and Auto Differential    Echocardiogram Stress Test       PLAN     1.  Inappropriate sinus tachycardia and palpitations.  Patient's description is suggestive more of inappropriate sinus tachycardia and some of that may be related to just some physical deconditioning as he is put aside a lot of his exercise habits over the past you know year and intermittently several years.  We have requested a 24-hour Holter monitor on although he is aware of his increased heart rate his heart rate today during his EKG was normal and if his average heart rate over 24 hours is less than 100 there is not necessarily anything that needs to be done.  Will check his CBC and a TSH.  2.  Shortness of breath on exertion.  The description is more suggestive of physical deconditioning than heart failure but would like to both check his  heart function and make sure this is not an atypical presentation of coronary disease is given his calcium score he does have some plaque in his LAD.  We have requested a stress echo.  3.  Coronary calcium score between 2 and 400.  Most of that is within the LAD.  Please see the request for the stress echo above.  4.  Hyperlipidemia.  Patient is on statin therapy but has not had his follow-up lipid panel we have requested 1 with his other labs.  Will see him back after his testing and with some of his other concerns and complaint we have urged him to discuss these further with his primary care physician.

## 2024-10-03 ENCOUNTER — LAB (OUTPATIENT)
Dept: LAB | Facility: LAB | Age: 44
End: 2024-10-03
Payer: COMMERCIAL

## 2024-10-03 DIAGNOSIS — R00.0 SINUS TACHYCARDIA: ICD-10-CM

## 2024-10-03 DIAGNOSIS — Z12.5 ENCOUNTER FOR SCREENING FOR MALIGNANT NEOPLASM OF PROSTATE: ICD-10-CM

## 2024-10-03 DIAGNOSIS — F32.A ANXIETY AND DEPRESSION: ICD-10-CM

## 2024-10-03 DIAGNOSIS — R73.9 HYPERGLYCEMIA: ICD-10-CM

## 2024-10-03 DIAGNOSIS — E78.00 ELEVATED LDL CHOLESTEROL LEVEL: ICD-10-CM

## 2024-10-03 DIAGNOSIS — R06.02 SHORTNESS OF BREATH ON EXERTION: ICD-10-CM

## 2024-10-03 DIAGNOSIS — R03.0 BORDERLINE HYPERTENSION: ICD-10-CM

## 2024-10-03 DIAGNOSIS — Z13.1 DIABETES MELLITUS SCREENING: ICD-10-CM

## 2024-10-03 DIAGNOSIS — R93.1 AGATSTON CAC SCORE 200-399: ICD-10-CM

## 2024-10-03 DIAGNOSIS — E78.2 MIXED HYPERLIPIDEMIA: ICD-10-CM

## 2024-10-03 DIAGNOSIS — Z00.00 ROUTINE GENERAL MEDICAL EXAMINATION AT A HEALTH CARE FACILITY: ICD-10-CM

## 2024-10-03 DIAGNOSIS — E55.9 VITAMIN D DEFICIENCY: ICD-10-CM

## 2024-10-03 DIAGNOSIS — F41.9 ANXIETY AND DEPRESSION: ICD-10-CM

## 2024-10-03 LAB
25(OH)D3 SERPL-MCNC: 31 NG/ML (ref 30–100)
ALBUMIN SERPL BCP-MCNC: 4.8 G/DL (ref 3.4–5)
ALP SERPL-CCNC: 81 U/L (ref 33–120)
ALT SERPL W P-5'-P-CCNC: 37 U/L (ref 10–52)
ANION GAP SERPL CALC-SCNC: 12 MMOL/L (ref 10–20)
AST SERPL W P-5'-P-CCNC: 22 U/L (ref 9–39)
BASOPHILS # BLD AUTO: 0.05 X10*3/UL (ref 0–0.1)
BASOPHILS NFR BLD AUTO: 0.6 %
BILIRUB DIRECT SERPL-MCNC: 0.1 MG/DL (ref 0–0.3)
BILIRUB SERPL-MCNC: 0.9 MG/DL (ref 0–1.2)
BUN SERPL-MCNC: 16 MG/DL (ref 6–23)
CALCIUM SERPL-MCNC: 9.9 MG/DL (ref 8.6–10.6)
CHLORIDE SERPL-SCNC: 105 MMOL/L (ref 98–107)
CHOLEST SERPL-MCNC: 137 MG/DL (ref 0–199)
CHOLESTEROL/HDL RATIO: 4.3
CO2 SERPL-SCNC: 29 MMOL/L (ref 21–32)
CREAT SERPL-MCNC: 1.01 MG/DL (ref 0.5–1.3)
EGFRCR SERPLBLD CKD-EPI 2021: >90 ML/MIN/1.73M*2
EOSINOPHIL # BLD AUTO: 0.1 X10*3/UL (ref 0–0.7)
EOSINOPHIL NFR BLD AUTO: 1.2 %
ERYTHROCYTE [DISTWIDTH] IN BLOOD BY AUTOMATED COUNT: 12.1 % (ref 11.5–14.5)
GLUCOSE SERPL-MCNC: 113 MG/DL (ref 74–99)
HCT VFR BLD AUTO: 46.5 % (ref 41–52)
HDLC SERPL-MCNC: 32 MG/DL
HGB BLD-MCNC: 15.6 G/DL (ref 13.5–17.5)
IMM GRANULOCYTES # BLD AUTO: 0.04 X10*3/UL (ref 0–0.7)
IMM GRANULOCYTES NFR BLD AUTO: 0.5 % (ref 0–0.9)
LDLC SERPL CALC-MCNC: 63 MG/DL
LYMPHOCYTES # BLD AUTO: 2.42 X10*3/UL (ref 1.2–4.8)
LYMPHOCYTES NFR BLD AUTO: 29.1 %
MCH RBC QN AUTO: 28 PG (ref 26–34)
MCHC RBC AUTO-ENTMCNC: 33.5 G/DL (ref 32–36)
MCV RBC AUTO: 84 FL (ref 80–100)
MONOCYTES # BLD AUTO: 0.71 X10*3/UL (ref 0.1–1)
MONOCYTES NFR BLD AUTO: 8.5 %
NEUTROPHILS # BLD AUTO: 5 X10*3/UL (ref 1.2–7.7)
NEUTROPHILS NFR BLD AUTO: 60.1 %
NON HDL CHOLESTEROL: 105 MG/DL (ref 0–149)
NRBC BLD-RTO: 0 /100 WBCS (ref 0–0)
PLATELET # BLD AUTO: 186 X10*3/UL (ref 150–450)
POTASSIUM SERPL-SCNC: 4.2 MMOL/L (ref 3.5–5.3)
PROT SERPL-MCNC: 7 G/DL (ref 6.4–8.2)
PSA SERPL-MCNC: 0.57 NG/ML
RBC # BLD AUTO: 5.57 X10*6/UL (ref 4.5–5.9)
SODIUM SERPL-SCNC: 142 MMOL/L (ref 136–145)
TRIGL SERPL-MCNC: 212 MG/DL (ref 0–149)
TSH SERPL-ACNC: 1.87 MIU/L (ref 0.44–3.98)
VIT B12 SERPL-MCNC: 733 PG/ML (ref 211–911)
VLDL: 42 MG/DL (ref 0–40)
WBC # BLD AUTO: 8.3 X10*3/UL (ref 4.4–11.3)

## 2024-10-03 PROCEDURE — 82607 VITAMIN B-12: CPT

## 2024-10-03 PROCEDURE — 82306 VITAMIN D 25 HYDROXY: CPT

## 2024-10-03 PROCEDURE — 82248 BILIRUBIN DIRECT: CPT

## 2024-10-03 PROCEDURE — 80053 COMPREHEN METABOLIC PANEL: CPT

## 2024-10-03 PROCEDURE — 84153 ASSAY OF PSA TOTAL: CPT

## 2024-10-03 PROCEDURE — 83036 HEMOGLOBIN GLYCOSYLATED A1C: CPT

## 2024-10-03 PROCEDURE — 80061 LIPID PANEL: CPT

## 2024-10-03 PROCEDURE — 36415 COLL VENOUS BLD VENIPUNCTURE: CPT

## 2024-10-03 PROCEDURE — 85025 COMPLETE CBC W/AUTO DIFF WBC: CPT

## 2024-10-03 PROCEDURE — 84443 ASSAY THYROID STIM HORMONE: CPT

## 2024-10-04 ENCOUNTER — TELEPHONE (OUTPATIENT)
Dept: CARDIOLOGY | Facility: CLINIC | Age: 44
End: 2024-10-04

## 2024-10-04 ENCOUNTER — ANCILLARY PROCEDURE (OUTPATIENT)
Dept: CARDIOLOGY | Facility: CLINIC | Age: 44
End: 2024-10-04
Payer: COMMERCIAL

## 2024-10-04 ENCOUNTER — PATIENT MESSAGE (OUTPATIENT)
Dept: DERMATOLOGY | Facility: CLINIC | Age: 44
End: 2024-10-04

## 2024-10-04 DIAGNOSIS — R73.9 HYPERGLYCEMIA: Primary | ICD-10-CM

## 2024-10-04 DIAGNOSIS — R00.0 SINUS TACHYCARDIA: ICD-10-CM

## 2024-10-04 DIAGNOSIS — Z13.1 DIABETES MELLITUS SCREENING: ICD-10-CM

## 2024-10-04 LAB
EST. AVERAGE GLUCOSE BLD GHB EST-MCNC: 100 MG/DL
HBA1C MFR BLD: 5.1 %

## 2024-10-04 PROCEDURE — 93227 XTRNL ECG REC<48 HR R&I: CPT | Performed by: INTERNAL MEDICINE

## 2024-10-04 PROCEDURE — 93225 XTRNL ECG REC<48 HRS REC: CPT | Performed by: INTERNAL MEDICINE

## 2024-10-04 NOTE — TELEPHONE ENCOUNTER
----- Message from Francine Carmichael sent at 10/4/2024  1:12 AM EDT -----  Labs look fine from a heart standpoint and cholesterol is much improved.  Continue current medications.  ----- Message -----  From: Lab, Background User  Sent: 10/3/2024   7:32 PM EDT  To: Francine Carmichael MD

## 2024-10-11 ENCOUNTER — APPOINTMENT (OUTPATIENT)
Dept: CARDIOLOGY | Facility: HOSPITAL | Age: 44
End: 2024-10-11
Payer: COMMERCIAL

## 2024-10-16 ENCOUNTER — HOSPITAL ENCOUNTER (OUTPATIENT)
Dept: CARDIOLOGY | Facility: HOSPITAL | Age: 44
Discharge: HOME | End: 2024-10-16
Payer: COMMERCIAL

## 2024-10-16 DIAGNOSIS — R93.1 AGATSTON CAC SCORE 200-399: ICD-10-CM

## 2024-10-16 DIAGNOSIS — R06.02 SHORTNESS OF BREATH ON EXERTION: ICD-10-CM

## 2024-10-16 PROCEDURE — 93017 CV STRESS TEST TRACING ONLY: CPT

## 2024-10-24 DIAGNOSIS — F32.A ANXIETY AND DEPRESSION: ICD-10-CM

## 2024-10-24 DIAGNOSIS — F41.9 ANXIETY AND DEPRESSION: ICD-10-CM

## 2024-10-24 RX ORDER — ESCITALOPRAM OXALATE 10 MG/1
10 TABLET ORAL DAILY
Qty: 30 TABLET | Refills: 2 | Status: SHIPPED | OUTPATIENT
Start: 2024-10-24

## 2024-10-28 ENCOUNTER — APPOINTMENT (OUTPATIENT)
Dept: CARDIOLOGY | Facility: CLINIC | Age: 44
End: 2024-10-28
Payer: COMMERCIAL

## 2024-10-28 VITALS
WEIGHT: 204 LBS | DIASTOLIC BLOOD PRESSURE: 80 MMHG | HEIGHT: 68 IN | SYSTOLIC BLOOD PRESSURE: 126 MMHG | HEART RATE: 80 BPM | BODY MASS INDEX: 30.92 KG/M2

## 2024-10-28 DIAGNOSIS — R00.0 SINUS TACHYCARDIA: ICD-10-CM

## 2024-10-28 DIAGNOSIS — R93.1 AGATSTON CAC SCORE 200-399: ICD-10-CM

## 2024-10-28 DIAGNOSIS — Z71.2 ENCOUNTER TO DISCUSS TEST RESULTS: Primary | ICD-10-CM

## 2024-10-28 DIAGNOSIS — R03.0 BORDERLINE HYPERTENSION: ICD-10-CM

## 2024-10-28 PROCEDURE — 99212 OFFICE O/P EST SF 10 MIN: CPT | Performed by: INTERNAL MEDICINE

## 2024-10-28 PROCEDURE — 3008F BODY MASS INDEX DOCD: CPT | Performed by: INTERNAL MEDICINE

## 2024-10-28 PROCEDURE — 1036F TOBACCO NON-USER: CPT | Performed by: INTERNAL MEDICINE

## 2024-11-15 ENCOUNTER — APPOINTMENT (OUTPATIENT)
Dept: CARDIOLOGY | Facility: CLINIC | Age: 44
End: 2024-11-15
Payer: COMMERCIAL

## 2024-11-22 ENCOUNTER — CLINICAL SUPPORT (OUTPATIENT)
Dept: AUDIOLOGY | Facility: CLINIC | Age: 44
End: 2024-11-22
Payer: COMMERCIAL

## 2024-11-22 DIAGNOSIS — H91.93 BILATERAL HEARING LOSS, UNSPECIFIED HEARING LOSS TYPE: ICD-10-CM

## 2024-11-22 DIAGNOSIS — H90.3 ASYMMETRICAL SENSORINEURAL HEARING LOSS: Primary | ICD-10-CM

## 2024-11-22 DIAGNOSIS — H90.3 BILATERAL HIGH FREQUENCY SENSORINEURAL HEARING LOSS: ICD-10-CM

## 2024-11-22 PROCEDURE — 92557 COMPREHENSIVE HEARING TEST: CPT | Performed by: AUDIOLOGIST

## 2024-11-22 PROCEDURE — 92550 TYMPANOMETRY & REFLEX THRESH: CPT | Performed by: AUDIOLOGIST

## 2024-11-22 PROCEDURE — 92565 STENGER TEST PURE TONE: CPT | Performed by: AUDIOLOGIST

## 2024-11-22 ASSESSMENT — ENCOUNTER SYMPTOMS: OCCASIONAL FEELINGS OF UNSTEADINESS: 0

## 2024-11-22 ASSESSMENT — PAIN SCALES - GENERAL: PAINLEVEL_OUTOF10: 0 - NO PAIN

## 2024-11-22 ASSESSMENT — PAIN - FUNCTIONAL ASSESSMENT: PAIN_FUNCTIONAL_ASSESSMENT: 0-10

## 2024-11-22 NOTE — PROGRESS NOTES
"AUDIOLOGY ADULT AUDIOMETRIC EVALUATION      Name:  Vladislav Gilliam \"Bradyv\"  :  1980  Age:  44 y.o.  Date of Evaluation: 24    History:  Reason for visit:  Mr. Vladislav Gilliam was seen today for an evaluation of hearing.   The patient was kindly referred by their primary care physician, Claudia Pichardo MD  Chief Complaint   Patient presents with    Hearing Loss     Reported a history of left sided hearing loss first diagnosed in childhood. Mentioned he had a history of swimming frequency as a child and hearing loss was discovered during a medical appointment. He stated in the past, the hearing testing was inconclusive and could not determine the cause or type of hearing loss. Stated he was told at times the hearing loss may be due to the nerve, or possible due to change in the ear canal. Due to the inconclusive past hearing testing, he did not pursue any type of hearing aid or assistive device.   Stated his most recent hearing test was performed approximately fifteen years ago, however, results were unavailable for review at this time.   He mentioned others have recently told him that he has been speaking louder, and the patient stated he has noticed more effort in his own speech. Stated he has felt himself speaking louder, in order to hear himself better. Mentioned he has noticed it has been easier for him to listen  on a speaker phone then holding the phone  up to his ear.   Reported in general communication with others has not been problematic and he has not had to ask for repetition or used increased effort when communicating. Mentioned at times hearing very soft speech or sometimes hearing in background noise may require more energy. Believes the hearing has not significantly changed, but he would like a current updated test.   Reported he has noticed some deep itching sensations in the left ear and stated he does currently utilize q-tips to dry out the ears if they are wet. "   History of some noise exposure as he currently plays the electric piano, however, stated he does not play everyday.   Denied any current otalgia, aural fullness, tinnitus, ear pressure, dizziness/vertigo, ear surgery, recent ear/sinus infections, recent falls, family history of early hearing loss, diabetes, chemotherapy/radiation, heart/kidney problems, recent heart attack/strokes, new onset of headaches, sinus/throat concerns, speech/memory concerns, ear drainage, or sudden hearing loss.    SCREENINGS  Steadi Fall Risk  One or more falls in the last year? No  How many Times?    Was the patient injured in the fall?    Has trouble stepping onto curb?    Advised to use a cane or walker to get around safely?    Often has to rush to toilet?    Feels unsteady when walking? No  Has lost some feeling in feet?    Often feels sad or depressed?    Steadies self on furniture while walking at home?    Takes medicine that makes them feel lightheaded or more tired than usual?    Worried about Falling? No  Takes medicine to sleep or improve mood?    Needs to push with hands when rising from a chair?      Domestic Violence Screening  Are you currently or have you recently been threatened or abused physically, emotionally, or secually by anyone? No  Do you feel UNSAFE going back to the place you are living? No  Pain Assessment  Pain Assessment: 0-10  0-10 (Numeric) Pain Score: 0 - No pain    EVALUATION     See Audiogram    RESULTS:    Otoscopic Evaluation:   Right Ear: Otoscopy revealed a clear healthy canal and a healthy tympanic membrane was visualized.   Left Ear: Otoscopy revealed a clear healthy canal and a healthy tympanic membrane was visualized.   Immittance:  Immittance Measures: 226 Hz   Right Ear: Tympanometric testing revealed a normal type A tympanogram with normal middle ear pressure and normal static compliance.  Left Ear: Tympanometric testing revealed a normal type A tympanogram with normal middle ear pressure and  normal static compliance.    Right: Ipsilateral acoustic reflexes were present at, 500-4,000 Hz, at expected sensation levels.  Left Ear: Ipsilateral acoustic reflexes were present at, 500-4,000 Hz, at expected sensation levels.     Test technique:  Pure Tone Audiometry via insert earphones    Reliability:   excellent    Pure Tone Audiometry:    Right Ear: Audiometric testing indicated normal peripheral hearing sensitivity through 3,000 Hz, sloping to a mild high frequency sensorineural hearing loss above.   Left Ear:   Audiometric testing indicated a mild to slight sensorineural hearing loss through 2,000 Hz, sloping to a mild sensorineural hearing loss through 4,000 Hz, sloping to a moderate sensorineural hearing loss above.   Negative Maurice noted at 250Hz, consistent with reliable auditory thresholds.   Note asymmetry in the left ear.       Speech Audiometry:   Right Ear:  Speech Reception Threshold (SRT) was obtained at 15 dBHL                  Word Recognition scores were excellent (96%) in quiet when words were presented at 55 dBHL  Left Ear:  Speech Reception Threshold (SRT) was obtained at  35 dBHL                  Word Recognition scores were good (84%) in quiet when words were presented at 70 dBHL  Testing was performed with recorded NU-6 speech words in quiet. Speech thresholds were in good agreement with the pure tone averages in each ear.     Binaural QuickSin testing indicated a score of 2.5 in the right ear. Results are consistent with a normal (0-3 dB) signal to noise ratio loss. Results suggest the patient may hear better than those with normal hearing in the presence of background noise.   Binaural QuickSin testing indicated a score of 10.5 in the left ear. Results are consistent with a moderate (7-15 dB) signal to noise ratio loss. Results suggest the patient may benefit from directional microphones in a hearing aid as well as a possible multiple microphone assistive device to help reduce the  signal to noise ratio.     IMPRESSIONS:  Today's test results are  consistent with a mild high frequency sensorineural hearing loss in the right ear and a mild to moderate sensorineural hearing loss in the left ear.   Results may indicated difficulty hearing in the presence of background noise or difficulty hearing the softer sounds of speech. The patient was counseled with regard to the findings. A hearing aid for the left ear was briefly discussed, to help provide a boost in overall sound to the left side. The patient stated at this time he has been able to hear and communicate without difficulty. He will continue to use communication strategies and monitor his hearing.    RECOMMENDATIONS:  * Continue medical follow up with Claudia Pichardo MD.  * Retest as medically indicated, or sooner if a change in hearing sensitivity is noticed.   * Wear hearing protection while in the presence of loud sounds.   * Pending medical management and patient desire, consider returning for a hearing aid evaluation to discuss amplification options and communication needs. The patient was instructed to call their insurance to check for any hearing aid benefits and to determine if [facility] was in network for hearing aids. The patient declined at this time.   * Use effective communication strategies such as asking the speaker to gain attention prior to speaking, speaking in the same room, repeating words that were heard, etc.    PATIENT EDUCATION:   Discussed results and recommendations with the patient and a copy of the hearing test was provided.  Questions were addressed and the patient was encouraged to contact our department should concerns arise.  Discussed speech intelligibility, cognitive load and listening effort. The patient was counseled although there are still significant hearing abilities, the sound sounds of speech are not coming in as easily as they once did. Counseled to consider hearing aids, to help reduce  the amount of listening effort required by the brain.  The patient was seen from  10:00-11:00 am.

## 2024-11-22 NOTE — LETTER
"2024     Claudia Pichardo MD  [address]  [facility] [address]    Patient: Fide Gilliam   YOB: 1980   Date of Visit: 2024       Dear Dr. Claudia Pichardo MD:    Thank you for referring Fide Gilliam to me for evaluation. Below are my notes for this consultation.  If you have questions, please do not hesitate to call me. I look forward to following your patient along with you.       Sincerely,     MARILYN Guillen, CCC-A      CC: No Recipients  ______________________________________________________________________________________    AUDIOLOGY ADULT AUDIOMETRIC EVALUATION      Name:  Vladislav Gilliam \"Fide\"  :  1980  Age:  44 y.o.  Date of Evaluation: 24    History:  Reason for visit:  Mr. Vladislav Gilliam was seen today for an evaluation of hearing.   The patient was kindly referred by their primary care physician, Claudia Pichardo MD  Chief Complaint   Patient presents with   • Hearing Loss     Reported a history of left sided hearing loss first diagnosed in childhood. Mentioned he had a history of swimming frequency as a child and hearing loss was discovered during a medical appointment. He stated in the past, the hearing testing was inconclusive and could not determine the cause or type of hearing loss. Stated he was told at times the hearing loss may be due to the nerve, or possible due to change in the ear canal. Due to the inconclusive past hearing testing, he did not pursue any type of hearing aid or assistive device.   Stated his most recent hearing test was performed approximately fifteen years ago, however, results were unavailable for review at this time.   He mentioned others have recently told him that he has been speaking louder, and the patient stated he has noticed more effort in his own speech. Stated he has felt himself speaking louder, in order to hear himself better. Mentioned he has noticed it has been easier for him to " listen  on a speaker phone then holding the phone  up to his ear.   Reported in general communication with others has not been problematic and he has not had to ask for repetition or used increased effort when communicating. Mentioned at times hearing very soft speech or sometimes hearing in background noise may require more energy. Believes the hearing has not significantly changed, but he would like a current updated test.   Reported he has noticed some deep itching sensations in the left ear and stated he does currently utilize q-tips to dry out the ears if they are wet.   History of some noise exposure as he currently plays the electric piano, however, stated he does not play everyday.   Denied any current otalgia, aural fullness, tinnitus, ear pressure, dizziness/vertigo, ear surgery, recent ear/sinus infections, recent falls, family history of early hearing loss, diabetes, chemotherapy/radiation, heart/kidney problems, recent heart attack/strokes, new onset of headaches, sinus/throat concerns, speech/memory concerns, ear drainage, or sudden hearing loss.    SCREENINGS  Steadi Fall Risk  One or more falls in the last year? No  How many Times?    Was the patient injured in the fall?    Has trouble stepping onto curb?    Advised to use a cane or walker to get around safely?    Often has to rush to toilet?    Feels unsteady when walking? No  Has lost some feeling in feet?    Often feels sad or depressed?    Steadies self on furniture while walking at home?    Takes medicine that makes them feel lightheaded or more tired than usual?    Worried about Falling? No  Takes medicine to sleep or improve mood?    Needs to push with hands when rising from a chair?      Domestic Violence Screening  Are you currently or have you recently been threatened or abused physically, emotionally, or secually by anyone? No  Do you feel UNSAFE going back to the place you are living? No  Pain Assessment  Pain Assessment:  0-10  0-10 (Numeric) Pain Score: 0 - No pain    EVALUATION     See Audiogram    RESULTS:    Otoscopic Evaluation:   Right Ear: Otoscopy revealed a clear healthy canal and a healthy tympanic membrane was visualized.   Left Ear: Otoscopy revealed a clear healthy canal and a healthy tympanic membrane was visualized.   Immittance:  Immittance Measures: 226 Hz   Right Ear: Tympanometric testing revealed a normal type A tympanogram with normal middle ear pressure and normal static compliance.  Left Ear: Tympanometric testing revealed a normal type A tympanogram with normal middle ear pressure and normal static compliance.    Right: Ipsilateral acoustic reflexes were present at, 500-4,000 Hz, at expected sensation levels.  Left Ear: Ipsilateral acoustic reflexes were present at, 500-4,000 Hz, at expected sensation levels.     Test technique:  Pure Tone Audiometry via insert earphones    Reliability:   excellent    Pure Tone Audiometry:    Right Ear: Audiometric testing indicated normal peripheral hearing sensitivity through 3,000 Hz, sloping to a mild high frequency sensorineural hearing loss above.   Left Ear:   Audiometric testing indicated a mild to slight sensorineural hearing loss through 2,000 Hz, sloping to a mild sensorineural hearing loss through 4,000 Hz, sloping to a moderate sensorineural hearing loss above.   Negative Maurice noted at 250Hz, consistent with reliable auditory thresholds.   Note asymmetry in the left ear.       Speech Audiometry:   Right Ear:  Speech Reception Threshold (SRT) was obtained at 15 dBHL                  Word Recognition scores were excellent (96%) in quiet when words were presented at 55 dBHL  Left Ear:  Speech Reception Threshold (SRT) was obtained at  35 dBHL                  Word Recognition scores were good (84%) in quiet when words were presented at 70 dBHL  Testing was performed with recorded NU-6 speech words in quiet. Speech thresholds were in good agreement with the pure  tone averages in each ear.     Binaural QuickSin testing indicated a score of 2.5 in the right ear. Results are consistent with a normal (0-3 dB) signal to noise ratio loss. Results suggest the patient may hear better than those with normal hearing in the presence of background noise.   Binaural QuickSin testing indicated a score of 10.5 in the left ear. Results are consistent with a moderate (7-15 dB) signal to noise ratio loss. Results suggest the patient may benefit from directional microphones in a hearing aid as well as a possible multiple microphone assistive device to help reduce the signal to noise ratio.     IMPRESSIONS:  Today's test results are  consistent with a mild high frequency sensorineural hearing loss in the right ear and a mild to moderate sensorineural hearing loss in the left ear.   Results may indicated difficulty hearing in the presence of background noise or difficulty hearing the softer sounds of speech. The patient was counseled with regard to the findings. A hearing aid for the left ear was briefly discussed, to help provide a boost in overall sound to the left side. The patient stated at this time he has been able to hear and communicate without difficulty. He will continue to use communication strategies and monitor his hearing.    RECOMMENDATIONS:  * Continue medical follow up with Claudia Pichardo MD.  * Retest as medically indicated, or sooner if a change in hearing sensitivity is noticed.   * Wear hearing protection while in the presence of loud sounds.   * Pending medical management and patient desire, consider returning for a hearing aid evaluation to discuss amplification options and communication needs. The patient was instructed to call their insurance to check for any hearing aid benefits and to determine if [facility] was in network for hearing aids. The patient declined at this time.   * Use effective communication strategies such as asking the speaker to gain  attention prior to speaking, speaking in the same room, repeating words that were heard, etc.    PATIENT EDUCATION:   Discussed results and recommendations with the patient and a copy of the hearing test was provided.  Questions were addressed and the patient was encouraged to contact our department should concerns arise.  Discussed speech intelligibility, cognitive load and listening effort. The patient was counseled although there are still significant hearing abilities, the sound sounds of speech are not coming in as easily as they once did. Counseled to consider hearing aids, to help reduce the amount of listening effort required by the brain.  The patient was seen from  10:00-11:00 am.

## 2024-11-26 ENCOUNTER — APPOINTMENT (OUTPATIENT)
Facility: CLINIC | Age: 44
End: 2024-11-26
Payer: COMMERCIAL

## 2024-11-26 VITALS — HEIGHT: 68 IN | BODY MASS INDEX: 32.05 KG/M2 | WEIGHT: 211.5 LBS

## 2024-11-26 DIAGNOSIS — L29.9 EAR ITCH: ICD-10-CM

## 2024-11-26 DIAGNOSIS — R09.81 NASAL CONGESTION: Primary | ICD-10-CM

## 2024-11-26 DIAGNOSIS — J34.89 NASAL OBSTRUCTION: ICD-10-CM

## 2024-11-26 PROCEDURE — 1036F TOBACCO NON-USER: CPT | Performed by: OTOLARYNGOLOGY

## 2024-11-26 PROCEDURE — 3008F BODY MASS INDEX DOCD: CPT | Performed by: OTOLARYNGOLOGY

## 2024-11-26 PROCEDURE — 31231 NASAL ENDOSCOPY DX: CPT | Performed by: OTOLARYNGOLOGY

## 2024-11-26 PROCEDURE — 99213 OFFICE O/P EST LOW 20 MIN: CPT | Performed by: OTOLARYNGOLOGY

## 2024-11-26 RX ORDER — FLUTICASONE PROPIONATE 50 MCG
2 SPRAY, SUSPENSION (ML) NASAL DAILY
Qty: 16 G | Refills: 3 | Status: SHIPPED | OUTPATIENT
Start: 2024-11-26 | End: 2024-12-26

## 2024-11-26 RX ORDER — FLUOCINOLONE ACETONIDE 0.11 MG/ML
5 OIL AURICULAR (OTIC) 2 TIMES DAILY
Qty: 20 ML | Refills: 1 | Status: SHIPPED | OUTPATIENT
Start: 2024-11-26

## 2024-11-26 RX ORDER — MOMETASONE FUROATE 100 %
POWDER (GRAM) MISCELLANEOUS
Qty: 60 G | Refills: 3 | Status: SHIPPED | OUTPATIENT
Start: 2024-11-26

## 2024-11-26 ASSESSMENT — PAIN SCALES - GENERAL: PAINLEVEL_OUTOF10: 0-NO PAIN

## 2024-11-29 ENCOUNTER — PATIENT MESSAGE (OUTPATIENT)
Facility: CLINIC | Age: 44
End: 2024-11-29
Payer: COMMERCIAL

## 2024-12-02 NOTE — PROGRESS NOTES
"Referring Provider: No ref. provider found    History of Present Illness:    Vladislav Gilliam is a 44 y.o. male, presenting for evaluation of multiple otolaryngologic issues.  He complains of nasal obstruction as well as an itchiness in his ear as well as snoring and throat clearing.  He has tried fluticasone which has not helped.  He picks his ears a lot.  No other pertinent issues.  No repeated sinus infections no history of prior otolaryngologic surgery.    ?  Review of Systems:    Review of symptoms was negative except for those stated including Cardiopulmonary, Genitourinary, Gastrointestinal, Psychological, Sleep pattern, Endocrine, Eyes, Neurologic, Musculoskeletal, Skin, Hematologic/Lymphatic and Allergic/Immunologic.     Medical History:    I have reviewed the patient's updated past medical history, surgical history, family history, social history, as well as current medications and allergies as of 11/26/2024. Changes to these items have been updated and marked as reviewed in the electronic medical record.    Physical Exam:    Vitals:  height is 1.727 m (5' 8\") and weight is 95.9 kg (211 lb 8 oz).   General: Patient doing well overall and is in no apparent distress.  Psych: Pleasant affect, and answers questions appropriately.  Head & Face: Symmetric facial movements  Eyes: Pupils equal, round, reactive.  Extraocular movements intact without gaze restrictions or nystagmus. No epiphora.  Ears:  External auditory canals are normal.  Tympanic membranes are clear.  No middle ear effusion is seen.  All middle ear landmarks are normal.  Nose: Anterior rhinoscopy revealed normal sinonasal mucosa. More posterior areas of the nasal cavity could not be completely examined.  Oral Cavity/Oropharynx:  Without lesions or masses to visual exam.  Neck: Supple without lymphadenopathy.  Lungs: Non-labored, and without evidence of stridor.  Cardiac: Pulses are strong, well-perfused.  Extremities: Without gross evidence of " clubbing, cyanosis, or edema.  Neuro: Cranial nerves II-XII grossly intact; Intact facial movements.    Procedure:  Rigid nasal endoscopy (64374)  Pre-procedure diagnosis/Indication for procedure:  To evaluate areas not visualized on anterior rhinoscopy   Anesthesia:  None  Description:  A 0-degree 3-mm rigid nasal endoscope was used to examine the left and right nasal cavities.  The nasal valve areas were examined for abnormalities or collapse.  The inferior and middle turbinates were evaluated.  The middle and superior meatuses, and the sphenoethmoid recesses were examined and inspected for mucopurulence and polyps. Once the endoscope was withdrawn, the patient was noted to have tolerated the procedure well without complications and was returned to ambulatory status.    Findings:    The middle meatus was clear bilaterally.  The nasopharynx was clear bilaterally.  The sphenoethmoidal recess was clear bilaterally.  There were moderately hypertrophied turbinates bilaterally.  No evidence of any mucopurulence.  Tolerated procedure well.      Assessment:     Vladislav Gilliam is a 44 y.o. male with chronic nasal obstruction    Plan:      I recommend trailing Derm-Otic for his ear itch and mometasone irrigations for his nasal congestion and likely chronic rhinitis.  Will also have him see Dr. Freed for consideration of nasal airway surgery.      Brendan Chatterjee MD, M.Eng.   of Otolaryngology - Head & Neck Surgery  Division of Rhinology and Endoscopic Skull Base Surgery  [facility]

## 2024-12-05 DIAGNOSIS — F32.A ANXIETY AND DEPRESSION: ICD-10-CM

## 2024-12-05 DIAGNOSIS — F41.9 ANXIETY AND DEPRESSION: ICD-10-CM

## 2024-12-05 RX ORDER — HYDROXYZINE PAMOATE 25 MG/1
25 CAPSULE ORAL 4 TIMES DAILY PRN
Qty: 120 CAPSULE | Refills: 0 | Status: SHIPPED | OUTPATIENT
Start: 2024-12-05 | End: 2025-01-04

## 2024-12-17 NOTE — PROGRESS NOTES
Facial Plastic & Reconstructive Surgery    Reason for consult: Nasal obstruction/Nasal lesion    Referring provider: Dr. Chattrejee    Chief Complaint: Obstructed breathing    43yo male with PMHx of LETY not CPAP compliant, TMJ, obesity, HTN presents today for surgical evaluation of left septal lesion and nasal airway obstruction.     Constant, present year round, does not fluctuate. It affects the patients ability to sleep, exercise, and is troubling during the day.    Symptoms began: Lesion 2 years ago , DOUGLAS >10 years    Nasal steroid or spray: Flonase BID but not consistently.     Site of obstruction: Bilateral     Previous Otolaryngology Provider: Dr. Chatterjee  Previous documentation for this patient was extensively reviewed including specific reasons for evaluation. Complex nature of complaint and medical issues prompting evaluation for surgical consideration by facial plastic & reconstructive surgeon.    Previous surgery: No    Previous CT/MRI imaging of the nasal cavity and sinuses: No    Trauma history: No    Sleep apnea or snoring history: Both    Allergic rhinitis, sinusitis history: Allergies    Smoking: No    Aesthetic concern: No    Past Medical History  He has a past medical history of Anxiety, Hyperlipidemia, Personal history of other diseases of the circulatory system, Personal history of urinary calculi (05/04/2021), and Unspecified chronic otitis externa, unspecified ear (06/15/2021).    Surgical History  He has a past surgical history that includes Other surgical history (05/04/2021).     Social History  He reports that he has never smoked. He has never been exposed to tobacco smoke. He has never used smokeless tobacco. He reports that he does not drink alcohol and does not use drugs.    Family History  Family History   Problem Relation Name Age of Onset    Colon cancer Mother      Aortic stenosis Father      Epilepsy Father          Allergies  Patient has no known allergies.    Physical  exam    General: Well-developed and well-nourished in appearance.  Skin: No rashes or concerning lesions on the visible portions of the skin.  Eyes: Extraocular movements intact. Visual fields grossly normal.  Ears: Pinna are normal in shape and position. External canals are patent.  Oral Cavity/Oropharynx: Dentition is intact. Mucous membranes moist. No masses or lesions.  Respiratory: No respiratory distress. Quiet breathing without stertor or stridor.  Cardiovascular: Regular rate and rhythm. Warm extremities with equal pulses.  Psych: Normal mood and affect. Judgement and insight appropriate.  Neuro: Alert and oriented. CN II-XII grossly intact. No focal deficits.  Musculoskeletal: Gait intact. Moves all extremities well without apparent deformities.    A comprehensive facial exam was performed with the following highlights:    Nasal skin type: Mod    Internal exam:   Septum: Septal deviation to the left, 2mm well circumscribed granular lesion at anterior septum  Inferior turbinates: Hypertrophic bilaterally  Nasal valve angle: Decreased bilaterally    Left anterior septal mucosa with raised lesion, non tender, slightly pale. No surrounding vascularity. No ulceration. 1%lidocaine with epinephrine injected at the site with 3 mm punch biopsy used to circumferentially cut around the lesion. A  and scissors was used to dissect the lesion from the underlying cartilage and this was sent for pathology. The patient tolerated well. Ointment was applied.    Intranasal examination performed with limited view on anterior rhinoscopy.    Procedures:    Procedure: Rigid diagnostic nasal endoscopy  Surgeon: Silver Freed MD  Anesthesia: None  Timeout: Performed  Findings: A 0-degree rigid endoscope was passed through the patient's bilateral naris. The first pass was along the floor of the nose to the nasopharynx. The second pass was to the area of the middle meatus. The third pass was to the sphenoethmoidal  recess.    Septum: Deviation is S shaped with bilateral obstruction approximately 90% without perforations nor synechia.  Internal Nasal Valve: Angle is reduced, narrowing the nasal airway bilaterally.    Right nasal cavity:  Inferior turbinate: 2+  Inferior meatus: Clear, no discharge, no polyps/masses/lesions  Middle meatus: Clear, no discharge, no polyps/masses/lesions    Left nasal cavity:  Inferior turbinate: 2+  Inferior meatus: Clear, no discharge, no polyps/masses/lesions  Middle meatus: Clear, no discharge, no polyps/masses/lesions  Nasopharynx: Clear, no discharge, no masses/lesions    Modified Jonathon Maneuver: Performed with a cotton tipped applicator, markedly improves breathing bilaterally.    Assessment - This patient has a significant mechanical nasal obstruction. The cause is multifactorial with septal deviation with severe internal nasal valve narrowing, turbinate hypertrophy, and static internal nasal valve collapse. There is some dynamic nasal valve collapse as well. Correction of this nasal obstruction will require a septoplasty, repair of nasal valve collapse with structural grafting, and a bilateral turbinate reduction. We discussed the medical necessity of this at length, as well as the risks and limitations of the procedures. All questions were answered.      Plan - Discussed reconstructive septoplasty, nasal valve repair with structural grafting, and inferior turbinate reduction with lateralization.     Excision of 3 mm nasal lesion performed in office today. Will follow up with results

## 2024-12-18 ENCOUNTER — APPOINTMENT (OUTPATIENT)
Dept: OTOLARYNGOLOGY | Facility: CLINIC | Age: 44
End: 2024-12-18
Payer: COMMERCIAL

## 2024-12-18 VITALS — HEIGHT: 68 IN | WEIGHT: 209.4 LBS | BODY MASS INDEX: 31.74 KG/M2

## 2024-12-18 DIAGNOSIS — J34.89 NASAL LESION: ICD-10-CM

## 2024-12-18 DIAGNOSIS — J34.89 NASAL OBSTRUCTION: ICD-10-CM

## 2024-12-18 DIAGNOSIS — R09.81 NASAL CONGESTION: ICD-10-CM

## 2024-12-18 DIAGNOSIS — R06.89 DIFFICULTY BREATHING: Primary | ICD-10-CM

## 2024-12-18 PROCEDURE — 88312 SPECIAL STAINS GROUP 1: CPT | Performed by: STUDENT IN AN ORGANIZED HEALTH CARE EDUCATION/TRAINING PROGRAM

## 2024-12-18 PROCEDURE — 88312 SPECIAL STAINS GROUP 1: CPT

## 2024-12-18 PROCEDURE — 3008F BODY MASS INDEX DOCD: CPT | Performed by: OTOLARYNGOLOGY

## 2024-12-18 PROCEDURE — 88305 TISSUE EXAM BY PATHOLOGIST: CPT | Performed by: STUDENT IN AN ORGANIZED HEALTH CARE EDUCATION/TRAINING PROGRAM

## 2024-12-18 PROCEDURE — 88305 TISSUE EXAM BY PATHOLOGIST: CPT

## 2024-12-18 PROCEDURE — 31231 NASAL ENDOSCOPY DX: CPT | Performed by: OTOLARYNGOLOGY

## 2024-12-18 PROCEDURE — 30117 REMOVAL OF INTRANASAL LESION: CPT | Performed by: OTOLARYNGOLOGY

## 2024-12-18 PROCEDURE — 99213 OFFICE O/P EST LOW 20 MIN: CPT | Performed by: OTOLARYNGOLOGY

## 2024-12-18 RX ORDER — MUPIROCIN 20 MG/G
OINTMENT TOPICAL
Qty: 15 G | Refills: 0 | Status: SHIPPED | OUTPATIENT
Start: 2024-12-18

## 2024-12-18 RX ORDER — FLUTICASONE PROPIONATE 50 MCG
1 SPRAY, SUSPENSION (ML) NASAL 2 TIMES DAILY
Qty: 16 G | Refills: 11 | Status: SHIPPED | OUTPATIENT
Start: 2024-12-18 | End: 2025-03-18

## 2024-12-18 ASSESSMENT — PATIENT HEALTH QUESTIONNAIRE - PHQ9
1. LITTLE INTEREST OR PLEASURE IN DOING THINGS: NOT AT ALL
2. FEELING DOWN, DEPRESSED OR HOPELESS: NOT AT ALL
SUM OF ALL RESPONSES TO PHQ9 QUESTIONS 1 AND 2: 0

## 2024-12-21 ENCOUNTER — APPOINTMENT (OUTPATIENT)
Dept: BEHAVIORAL HEALTH | Facility: CLINIC | Age: 44
End: 2024-12-21
Payer: COMMERCIAL

## 2024-12-26 LAB
LABORATORY COMMENT REPORT: NORMAL
PATH REPORT.FINAL DX SPEC: NORMAL
PATH REPORT.GROSS SPEC: NORMAL
PATH REPORT.RELEVANT HX SPEC: NORMAL
PATH REPORT.TOTAL CANCER: NORMAL
RESIDENT REVIEW: NORMAL

## 2024-12-29 ENCOUNTER — APPOINTMENT (OUTPATIENT)
Dept: BEHAVIORAL HEALTH | Facility: CLINIC | Age: 44
End: 2024-12-29
Payer: COMMERCIAL

## 2024-12-31 ENCOUNTER — APPOINTMENT (OUTPATIENT)
Facility: CLINIC | Age: 44
End: 2024-12-31
Payer: COMMERCIAL

## 2025-01-16 DIAGNOSIS — L73.9 FOLLICULITIS: ICD-10-CM

## 2025-01-16 RX ORDER — CLOBETASOL PROPIONATE 0.5 MG/ML
SOLUTION TOPICAL
Qty: 50 ML | Refills: 3 | Status: SHIPPED | OUTPATIENT
Start: 2025-01-16

## 2025-01-18 DIAGNOSIS — F41.9 ANXIETY AND DEPRESSION: ICD-10-CM

## 2025-01-18 DIAGNOSIS — F32.A ANXIETY AND DEPRESSION: ICD-10-CM

## 2025-01-18 RX ORDER — ESCITALOPRAM OXALATE 10 MG/1
10 TABLET ORAL DAILY
Qty: 30 TABLET | Refills: 2 | Status: SHIPPED | OUTPATIENT
Start: 2025-01-18

## 2025-01-28 ENCOUNTER — APPOINTMENT (OUTPATIENT)
Facility: CLINIC | Age: 45
End: 2025-01-28
Payer: COMMERCIAL

## 2025-03-23 DIAGNOSIS — E78.00 ELEVATED LDL CHOLESTEROL LEVEL: ICD-10-CM

## 2025-03-25 RX ORDER — ATORVASTATIN CALCIUM 40 MG/1
40 TABLET, FILM COATED ORAL DAILY
Qty: 90 TABLET | Refills: 3 | Status: SHIPPED | OUTPATIENT
Start: 2025-03-25

## 2025-03-28 DIAGNOSIS — F41.1 GAD (GENERALIZED ANXIETY DISORDER): ICD-10-CM

## 2025-03-28 RX ORDER — HYDROXYZINE PAMOATE 25 MG/1
25 CAPSULE ORAL 4 TIMES DAILY PRN
Qty: 360 CAPSULE | Refills: 0 | Status: SHIPPED | OUTPATIENT
Start: 2025-03-28 | End: 2025-06-26

## 2025-05-22 DIAGNOSIS — L30.9 DERMATITIS: ICD-10-CM

## 2025-05-22 RX ORDER — BETAMETHASONE DIPROPIONATE 0.5 MG/G
CREAM TOPICAL 2 TIMES DAILY
Qty: 50 G | Refills: 3 | Status: SHIPPED | OUTPATIENT
Start: 2025-05-22

## 2025-07-02 ENCOUNTER — APPOINTMENT (OUTPATIENT)
Dept: PRIMARY CARE | Facility: CLINIC | Age: 45
End: 2025-07-02
Payer: COMMERCIAL

## 2025-07-02 VITALS
HEIGHT: 69 IN | WEIGHT: 205 LBS | SYSTOLIC BLOOD PRESSURE: 125 MMHG | HEART RATE: 74 BPM | TEMPERATURE: 97.3 F | DIASTOLIC BLOOD PRESSURE: 86 MMHG | BODY MASS INDEX: 30.36 KG/M2

## 2025-07-02 DIAGNOSIS — Z00.00 ROUTINE GENERAL MEDICAL EXAMINATION AT A HEALTH CARE FACILITY: Primary | ICD-10-CM

## 2025-07-02 DIAGNOSIS — E55.9 VITAMIN D DEFICIENCY: ICD-10-CM

## 2025-07-02 DIAGNOSIS — H93.11 TINNITUS OF RIGHT EAR: ICD-10-CM

## 2025-07-02 DIAGNOSIS — Z12.5 PROSTATE CANCER SCREENING: ICD-10-CM

## 2025-07-02 DIAGNOSIS — E78.5 HYPERLIPIDEMIA, UNSPECIFIED HYPERLIPIDEMIA TYPE: ICD-10-CM

## 2025-07-02 DIAGNOSIS — L98.9 SKIN LESIONS: ICD-10-CM

## 2025-07-02 DIAGNOSIS — F41.9 ANXIETY AND DEPRESSION: ICD-10-CM

## 2025-07-02 DIAGNOSIS — Z13.1 DIABETES MELLITUS SCREENING: ICD-10-CM

## 2025-07-02 DIAGNOSIS — R03.0 ELEVATED BLOOD PRESSURE READING: ICD-10-CM

## 2025-07-02 DIAGNOSIS — F32.A ANXIETY AND DEPRESSION: ICD-10-CM

## 2025-07-02 DIAGNOSIS — Z00.00 WELL ADULT HEALTH CHECK: ICD-10-CM

## 2025-07-02 PROCEDURE — 99214 OFFICE O/P EST MOD 30 MIN: CPT | Performed by: INTERNAL MEDICINE

## 2025-07-02 PROCEDURE — G8433 SCR FOR DEP NOT CPT DOC RSN: HCPCS | Performed by: INTERNAL MEDICINE

## 2025-07-02 PROCEDURE — 3008F BODY MASS INDEX DOCD: CPT | Performed by: INTERNAL MEDICINE

## 2025-07-02 PROCEDURE — 1036F TOBACCO NON-USER: CPT | Performed by: INTERNAL MEDICINE

## 2025-07-02 NOTE — PROGRESS NOTES
"Assessment/Plan   Problem List Items Addressed This Visit       Anxiety and depression    Relevant Orders    TSH with reflex to Free T4 if abnormal    Elevated blood pressure reading    Skin lesions    HLD (hyperlipidemia)    Relevant Orders    Comprehensive Metabolic Panel    Lipid Panel    Vitamin D deficiency    Relevant Orders    Vitamin D 25-Hydroxy,Total (for eval of Vitamin D levels)    Routine general medical examination at a health care facility - Primary    Well adult health check    Relevant Orders    Comprehensive Metabolic Panel    Tinnitus of right ear    Relevant Orders    Referral to ENT     Other Visit Diagnoses         Prostate cancer screening        Relevant Orders    Prostate Specific Antigen        Discussed all the current conditions  Discussed the preventive care  This focus was mainly multiple problems  His skin lesions that he has benign in character but advised to seek dermatologist opinion  Elevated blood pressure at home but no evidence of uncontrolled status no additional treatment advised  Refill was provided  Labs ordered    Subjective   Patient ID: Vladislav Gilliam \"Goyo" is a 45 y.o. male who presents for Annual Exam.    Surgical History[1]   Family History[2]   Social History     Socioeconomic History    Marital status:      Spouse name: Not on file    Number of children: Not on file    Years of education: Not on file    Highest education level: Not on file   Occupational History    Not on file   Tobacco Use    Smoking status: Never     Passive exposure: Never    Smokeless tobacco: Never   Vaping Use    Vaping status: Not on file   Substance and Sexual Activity    Alcohol use: Never    Drug use: Never    Sexual activity: Yes     Partners: Female     Birth control/protection: Condom Male   Other Topics Concern    Not on file   Social History Narrative    Not on file     Social Drivers of Health     Financial Resource Strain: Not on File (3/19/2021)    Received from [facility]    " "Financial Resource Strain     Financial Resource Strain: 0   Food Insecurity: Not on File (3/19/2021)    Received from [facility]    Food Insecurity     Food: 0   Transportation Needs: Not on File (3/19/2021)    Received from [facility]    Transportation Needs     Transportation: 0   Physical Activity: Not on File (3/19/2021)    Received from [facility]    Physical Activity     Physical Activity: 0   Stress: Not on File (3/19/2021)    Received from [facility]    Stress     Stress: 0   Social Connections: Not on File (3/19/2021)    Received from [facility]    Social Connections     Social Connections and Isolation: 0   Intimate Partner Violence: Not on file   Housing Stability: Not on File (3/19/2021)    Received from [facility]    Housing Stability     Housin      Patient has no known allergies.   Current Rx[3]   Vitals:    25 1201 25 1202   BP: 116/81 125/86   BP Location: Left arm Right arm   Patient Position: Sitting Sitting   Pulse: 75 74   Temp: 36.3 °C (97.3 °F)    Weight: 93 kg (205 lb)    Height: 1.753 m (5' 9\")       Problem List Items Addressed This Visit       Anxiety and depression    Relevant Orders    TSH with reflex to Free T4 if abnormal    Elevated blood pressure reading    Skin lesions    HLD (hyperlipidemia)    Relevant Orders    Comprehensive Metabolic Panel    Lipid Panel    Vitamin D deficiency    Relevant Orders    Vitamin D 25-Hydroxy,Total (for eval of Vitamin D levels)    Routine general medical examination at a health care facility - Primary    Well adult health check    Relevant Orders    Comprehensive Metabolic Panel    Tinnitus of right ear    Relevant Orders    Referral to ENT     Other Visit Diagnoses         Prostate cancer screening        Relevant Orders    Prostate Specific Antigen           Orders Placed This Encounter   Procedures    Comprehensive Metabolic Panel     Standing Status:   Future     Number of Occurrences:   1     Expected Date:   2025     Expiration Date:   2026     " Release result to [facility]:   Immediate    TSH with reflex to Free T4 if abnormal     Standing Status:   Future     Number of Occurrences:   1     Expected Date:   7/2/2025     Expiration Date:   7/2/2026     [facility]    Prostate Specific Antigen     Standing Status:   Future     Number of Occurrences:   1     Expected Date:   7/2/2025     Expiration Date:   7/2/2026     [facility]   [facility] [1]    Lipid Panel     Standing Status:   Future     Number of Occurrences:   1     Expected Date:   7/2/2025     Expiration Date:   7/2/2026     [facility]   [facility]    Hemoglobin A1C     Standing Status:   Future     Number of Occurrences:   1     Expected Date:   7/2/2025     Expiration Date:   7/2/2026     [facility]   [facility]    Vitamin D 25-Hydroxy,Total (for eval of Vitamin D levels)     Standing Status:   Future     Number of Occurrences:   1     Expected Date:   7/2/2025     Expiration Date:   7/2/2026     [facility]   Im[facility] [1]    Referral to ENT     Standing Status:   Future     Expected Date:   7/2/2025     Expiration Date:   7/2/2026     Referral Priority:   Routine     Referral Type:   Consultation     Referral Reason:   Specialty Services Required     Requested Specialty:   Otolaryngology     Number of Visits Requested:   2        HPI  Well visit but has multiple issues to discuss  He follows dermatologist but he has some pigmentary skin changes in the abdominal wall he is concerned  His blood pressure at home has been elevated but he remains fine  He has not had recent blood work    ROS  As above noted  Complaining of ringing in the right ear  Additionally he has some anxiety issues  Past medical history reviewed  Social and family history reviewed  Allergies and medications reviewed  Recent labs reviewed  Vital signs reviewed  PHYSICAL EXAM  Your exam is normal  Multiple pigmentary skin lesions  Looks  "benign  Cardiovascular chest abdominal neurological exam normal      No results found for: \"PR1\", \"BMPR1A\", \"CMPLAS\", \"DN9SAAWI\", \"KPSAT\"   Lab Results   Component Value Date    CHOL 137 10/03/2024    LDLCALC 63 10/03/2024    CHHDL 4.3 10/03/2024     Lab Results   Component Value Date    TSH 1.87 10/03/2024    HGBA1C 5.1 10/03/2024    PSA 0.57 10/03/2024              === 10/03/23 ===    CT CARDIAC SCORING WO IV CONTRAST    - Impression -  1. Coronary artery calcium score of 246*.      *Coronary Artery Calcium Gated and Nongated Agatston score  Score                                      Risk  0                                       Very low  1-99                                  Mildly increased  100-299                             Moderately increased  >300                                Moderate to severely increased    Vaishnavi et al. JCCT 2016 (http://dx.doi.org/10.1016/j.jcct.2016.11.003)    SLOAN 10-Year CHD Risk with Coronary Artery Calcification can be  calcuate using link below    [email or web address]    Michael et al. JACC 2015 (http://dx.doi.org/10.1016/j.j  acc.2015.08.035)    Signed by: Stevie Sims 10/3/2023 4:38 PM  Dictation workstation:   [id number]    Results for orders placed during the hospital encounter of 10/16/24    Echocardiogram Stress Test    Narrative  [facility]  [id number] [place] [id number]  Tel [phone] Fax [phone]    Exercise Stress Echo    Patient Name:      MIROSLAVA TADEO  Ordering Provider:     [id number] DIANE GUERRERO  Study Date:        10/16/2024           Reading Physician:     [id number] Trejo MD  MRN/PID:           [id number]             Supervising Physician: Liv Trejo MD  Accession#:        [id number]         Fellow:  Date of Birth/Age: 1980 / 44 years Fellow:  Gender:            M                    Nurse:                 [id number] Melissa Castro CNP  Admit " Date:                             Technician:            Darshana Shah  Admission Status:                       Sonographer:           Wandy Rico  RDCS  Height:            172.72 cm            Technologist:  Weight:            90.72 kg             Additional Staff:  BSA:               2.04 m2              Encounter#:            [id number]  BMI:               30.41 kg/m2          Patient Location:    Study Type:    ECHOCARDIOGRAM STRESS TEST  Diagnosis/ICD: Abnormal findings on diagnostic imaging of heart and coronary  circulation-R93.1; Shortness of breath-R06.02  Indication:    ABNORMAL CALCIUM SCORE, SOB  CPT Codes:     Stress Echo-21969; Stress Test Interpretation-12618    Falls Risk:    Study Details: Correct procedure and correct patient verified verbally.      Patient History:  Allergies: None.      Patient Performance: The patient exercised to stage III on a Nathaniel protocol for 7 minutes and 00 seconds, achieving 8.2 METS. The peak heart rate achieved was 155 bpm, which was 89 % of the age predicted target heart rate of 174 bpm. The resting blood pressure was 116/82 mmHg with a heart rate of 78 bpm. The patient developed no symptoms during the stress exam. The blood pressure response was normal. The test was terminated due to: dyspnea and fatigue.    Stress ECG: No ST changes.    Stress Stage Data:  +----------------+---+------+-------+---------------+------+--------+                  HR Sys BPDias BPRPE            METS  Comments  +----------------+---+------+-------+---------------+------+--------+  Baseline [id number] 116   82                                    +----------------+---+------+-------+---------------+------+--------+  Stage I         [id number]   82     11=Fairly light4.6   NO CP     +----------------+---+------+-------+---------------+------+--------+  Stage II        [id number]   84     17=Very hard   7 METSNO CP      +----------------+---+------+-------+---------------+------+--------+      +-----------+---+------+-------+--------+             HR Sys BPDias BPComments  +-----------+---+------+-------+--------+  Recovery I [id number]   80     NO CP     +-----------+---+------+-------+--------+  Recovery II98 128   74     NO CP     +-----------+---+------+-------+--------+      Baseline Echo: Global LV systolic function is normal. There are no regional wall motion abnormalities at baseline.    Stress Echo: There are no stress induced regional wall motion abnormalities. The ejection fraction is approximately 70 to 75% at peak stress. The left ventricular internal cavity size at peak stress is decreased. At peak stress, the global LV systolic function is hyperdynamic.    Summary:  1. Normal global left ventricular systolic function.  2. Adequate level of stress achieved.  3. Normal echo Stress Test.  4. Low risk stress test.    [id number] Corby Trejo MD  Electronically signed on 10/28/2024 at 1:48:29 PM                    ** Final **                          [1]   Past Surgical History:  Procedure Laterality Date    OTHER SURGICAL HISTORY  05/04/2021    No history of surgery   [2]   Family History  Problem Relation Name Age of Onset    Colon cancer Mother      Aortic stenosis Father      Epilepsy Father     [3]   Current Outpatient Medications   Medication Sig Dispense Refill    atorvastatin (Lipitor) 40 mg tablet TAKE 1 TABLET BY MOUTH EVERY DAY 90 tablet 3    betamethasone, augmented, (Diprolene AF) 0.05 % cream APPLY TO AFFECTED AREA TWICE A DAY 50 g 3    clobetasol (Temovate) 0.05 % external solution APPLY TO AFFECTED AREA TWICE A DAY FOR 14 DAYS 50 mL 3    fluocinolone (DermOtic) 0.01 % ear drops Administer 5 drops into each ear 2 times a day. 20 mL 1    fluticasone (Flonase) 50 mcg/actuation nasal spray Administer 2 sprays into each nostril once daily. Shake gently. Before first use, prime pump. After use,  clean tip and replace cap. 16 g 3    hydrOXYzine pamoate (Vistaril) 25 mg capsule Take 1 capsule (25 mg) by mouth 4 times a day as needed for itching or anxiety. (Patient taking differently: Take 1 capsule (25 mg) by mouth once daily.) 120 capsule 0    LORazepam (Ativan) 0.5 mg tablet TAKE 1 TABLET BY MOUTH 2 TIMES A DAY AS NEEDED FOR ANXIETY 60 tablet 0    mometasone furoate, bulk, 100 % powder Compound 1 mg capsule to be added to sinus rinse twice daily. 60 g 3    nortriptyline (Pamelor) 10 mg capsule TAKE 2 CAPSULES BY MOUTH AT 9 PM (Patient taking differently: Take 1 capsule (10 mg) by mouth once daily.)      tiZANidine (Zanaflex) 4 mg tablet Take 1 tablet (4 mg) by mouth once daily at bedtime. 90 tablet 1    escitalopram (Lexapro) 10 mg tablet TAKE 1 TABLET BY MOUTH EVERY DAY (Patient not taking: Reported on 7/2/2025) 30 tablet 2    fluticasone (Flonase) 50 mcg/actuation nasal spray Administer 1 spray into each nostril 2 times a day. Shake gently. Before first use, prime pump. After use, clean tip and replace cap. (Patient not taking: Reported on 7/2/2025) 16 g 11    hydrOXYzine pamoate (Vistaril) 25 mg capsule TAKE 1 CAPSULE (25 MG) BY MOUTH 4 TIMES A DAY AS NEEDED FOR ITCHING. (Patient not taking: Reported on 7/2/2025) 360 capsule 0    mupirocin (Bactroban) 2 % ointment Apply to bilateral nostrils three times a day for 14 days (Patient not taking: Reported on 7/2/2025) 15 g 0     No current facility-administered medications for this visit.

## 2025-07-03 LAB
25(OH)D3+25(OH)D2 SERPL-MCNC: 34 NG/ML (ref 30–100)
ALBUMIN SERPL-MCNC: 4.8 G/DL (ref 3.6–5.1)
ALP SERPL-CCNC: 79 U/L (ref 36–130)
ALT SERPL-CCNC: 32 U/L (ref 9–46)
ANION GAP SERPL CALCULATED.4IONS-SCNC: 10 MMOL/L (CALC) (ref 7–17)
AST SERPL-CCNC: 19 U/L (ref 10–40)
BILIRUB SERPL-MCNC: 0.9 MG/DL (ref 0.2–1.2)
BUN SERPL-MCNC: 19 MG/DL (ref 7–25)
CALCIUM SERPL-MCNC: 10 MG/DL (ref 8.6–10.3)
CHLORIDE SERPL-SCNC: 104 MMOL/L (ref 98–110)
CHOLEST SERPL-MCNC: 157 MG/DL
CHOLEST/HDLC SERPL: 4.6 (CALC)
CO2 SERPL-SCNC: 28 MMOL/L (ref 20–32)
CREAT SERPL-MCNC: 1.04 MG/DL (ref 0.6–1.29)
EGFRCR SERPLBLD CKD-EPI 2021: 89 ML/MIN/1.73M2
EST. AVERAGE GLUCOSE BLD GHB EST-MCNC: 114 MG/DL
EST. AVERAGE GLUCOSE BLD GHB EST-SCNC: 6.3 MMOL/L
GLUCOSE SERPL-MCNC: 104 MG/DL (ref 65–99)
HBA1C MFR BLD: 5.6 %
HDLC SERPL-MCNC: 34 MG/DL
LDLC SERPL CALC-MCNC: 87 MG/DL (CALC)
NONHDLC SERPL-MCNC: 123 MG/DL (CALC)
POTASSIUM SERPL-SCNC: 4.1 MMOL/L (ref 3.5–5.3)
PROT SERPL-MCNC: 7 G/DL (ref 6.1–8.1)
PSA SERPL-MCNC: 0.63 NG/ML
SODIUM SERPL-SCNC: 142 MMOL/L (ref 135–146)
TRIGL SERPL-MCNC: 296 MG/DL
TSH SERPL-ACNC: 1.66 MIU/L (ref 0.4–4.5)

## 2025-07-03 NOTE — RESULT ENCOUNTER NOTE
See result  Triglycerides elevated  Consider carb controll diet and repeat testing in 3 moth   Otherwise additional medication will be needed

## 2025-07-06 ENCOUNTER — APPOINTMENT (OUTPATIENT)
Dept: BEHAVIORAL HEALTH | Facility: CLINIC | Age: 45
End: 2025-07-06
Payer: COMMERCIAL

## 2025-07-06 DIAGNOSIS — G47.00 INSOMNIA, UNSPECIFIED TYPE: ICD-10-CM

## 2025-07-06 DIAGNOSIS — F41.9 ANXIETY: ICD-10-CM

## 2025-07-06 PROCEDURE — 99203 OFFICE O/P NEW LOW 30 MIN: CPT

## 2025-07-06 PROCEDURE — 1036F TOBACCO NON-USER: CPT

## 2025-07-06 RX ORDER — CLONIDINE HYDROCHLORIDE 0.1 MG/1
0.1 TABLET ORAL NIGHTLY
Qty: 30 TABLET | Refills: 3 | Status: SHIPPED | OUTPATIENT
Start: 2025-07-06 | End: 2026-07-06

## 2025-07-06 ASSESSMENT — ENCOUNTER SYMPTOMS: INSOMNIA: 1

## 2025-07-06 NOTE — PROGRESS NOTES
"Subjective   Patient ID: Vladislav Gilliam \"Fide\" is a 45 y.o. 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 Work has caused significant stress and his sleep has been affected.     He is sometimes up until 5 am finishing up with work. The patient typically gets 6 hours of uninterrupted sleep but it can be as low as 4 hours.     He had an unusual situation present in which he was hearing  a male opera during the night with no identified source. No one else could hear it but him. This stopped happening 2 weeks ago and it had never happened until recently.     Patient has ongoing anxiety but he hasn't experienced panic attacks. He hasn't had the need to take Ativan but he has it available in case he would need it.     He has occasional depression and had a 2 week period of depression but he is feeling ok now and he describes his mood as \"ok.\" No SI or HI    No changes in appetite. He would like to lose a few pounds but no other concerns.     Patient notes an increased blood pressure during the day and he attributes this to anxiety and stress. When checked in the doctor's office, he is never found to be hypertensive. He would like a medication to help with anxiety but not one that would increase blood pressure.    Kellie has been doing well and she went back to work part-time.     Psych Review of Symptoms:    Anxiety:   Generalized Anxiety Symptoms: Physiological symptoms of anxiety and sleep disturbances due to anxiety.       Depressive Symptoms:   Insomnia.       Psychotic Symptoms:     Hallucination types are positive for auditory.       Sleep Concerns:   Difficulty falling asleep.       Constitutional: no fever, no chills and not feeling poorly.   Eyes: no eyesight problems.   ENT: no hearing loss, no nosebleeds and no sore throat. "   Cardiovascular: no chest pain, no palpitations and no extremity edema.   Respiratory: no shortness of breath, no wheezing, no cough and no shortness of breath during exertion.   Gastrointestinal: no abdominal pain, no constipation, no heartburn, no diarrhea, no vomiting and no blood in stools.   Genitourinary: no dysuria, no incontinence, normal micturition, no testicular pain and as noted in HPI.   Musculoskeletal: no arthralgias and no gait abnormality.   Integumentary: no skin lesions, no skin wound and no change in a mole.   Neurological: no confusion, no dizziness, no fainting and no difficulty walking.   Psychiatric: not suicidal, mild anxiety and no depression.   All other systems have been reviewed and are negative for complaint.        Mental Status Examination  General Appearance: Well groomed, appropriate eye contact.  Gait/Station: not assessed due to virtual appointment   Speech: Normal rate, volume, prosody  Mood: OK  Affect: Euthymic, full-range  Thought Process: Linear, goal directed  Thought Associations: No loosening of associations  Thought Content: normal  Perception: No perceptual abnormalities noted  Level of Consciousness: Alert  Orientation: Alert and oriented to person, place, time and situation  Attention and Concentration: Intact  Recent Memory: Intact as evidenced by ability to recall details from the past 24 hours   Remote Memory: Intact as evidenced by ability to recall previous medical issues   Executive function: Intact  Language: Naming intact  Fund of knowledge: Good  Insight: intact  Judgment: Intact, as evidenced by help-seeking behavior, ability to reason through medical decision making, and compliance with treatment recommendations    Lab Review:   Office Visit on 07/02/2025   Component Date Value    GLUCOSE 07/02/2025 104 (H)     UREA NITROGEN (BUN) 07/02/2025 19     CREATININE 07/02/2025 1.04     EGFR 07/02/2025 89     SODIUM 07/02/2025 142     POTASSIUM 07/02/2025 4.1      CHLORIDE 07/02/2025 104     CARBON DIOXIDE 07/02/2025 28     ELECTROLYTE BALANCE 07/02/2025 10     CALCIUM 07/02/2025 10.0     PROTEIN, TOTAL 07/02/2025 7.0     ALBUMIN 07/02/2025 4.8     BILIRUBIN, TOTAL 07/02/2025 0.9     ALKALINE PHOSPHATASE 07/02/2025 79     AST 07/02/2025 19     ALT 07/02/2025 32     TSH W/REFLEX TO FT4 07/02/2025 1.66     PSA, TOTAL 07/02/2025 0.63     CHOLESTEROL, TOTAL 07/02/2025 157     HDL CHOLESTEROL 07/02/2025 34 (L)     TRIGLYCERIDES 07/02/2025 296 (H)     LDL-CHOLESTEROL 07/02/2025 87     CHOL/HDLC RATIO 07/02/2025 4.6     NON HDL CHOLESTEROL 07/02/2025 123     HEMOGLOBIN A1c 07/02/2025 5.6     eAG (mg/dL) 07/02/2025 114     eAG (mmol/L) 07/02/2025 6.3     VITAMIN D,25-OH,TOTAL,IA 07/02/2025 34      Safety Assessment - no current SI, (- ) past SI, no past suicide attempts. No guns in household. Risk factors -stressful occupation, mild depression, PF supportive friends and family, limited substance use history. Low Risk   Assessment/Plan   Diagnoses and all orders for this visit: The patient has been experiencing stress-related anxiety from work. Will start Clonidine to target anxiety symptoms and DC Hydroxyzine. Will have patient contact this provider to assess how effective the medication is. Will schedule follow up based on patient self-report.   Anxiety  -     cloNIDine (Catapres) 0.1 mg tablet; Take 1 tablet (0.1 mg) by mouth once daily at bedtime.  - Lorazepam 0.5 mg = take 1 tablet, twice daily as needed for anxiety   Insomnia         - Nortriptyline 10 mg - 1 tablet at bedtime  Time   5 minute chart review  25 minute direct patient contact   5 minutes charting    Total Time 35 minutes

## 2025-07-06 NOTE — PATIENT INSTRUCTIONS
1. Reviewed diagnostic impression including subjective and objective data and provided education about insomnia and  anxiety disorder,  etiology, treatment recommendations including medication, therapy, course of treatment and prognosis. Patient amendable to treatment plan.     2. Recommendations - We can start Clonidine at bedtime for anxiety. Email me this week and let me know if you are tolerating the medication and if it is having any effect. We can DC the Hydroxyzine. We can schedule a follow up once I receive this information.     DC Hydroxyzine  START Clonidine (Catapres)   0.1 mg - take 1 tablet at bedtime for anxiety         3. Reviewed r/b/a, possible side effects of the medication(s). Client is aware benefit/risks      4. Labs reviewed and discussed - no new orders      5. Consider therapy        You can look up therapists in your area by accessing the following web site and putting in your zip code.   <[email or web address]>     6. Follow up with physical health providers as scheduled     7. Send me an email to set up a follow up appointment this week.      . May follow up sooner if experiences worsening symptoms by calling  Psychiatry at [phone] or [phone] ([place])     Patient verbalized an understanding to call Mobile Crisis at [phone] ([facility]), [phone], or [phone]/go to the nearest emergency room if experiences thoughts of harm to self or others.

## 2025-07-17 ENCOUNTER — APPOINTMENT (OUTPATIENT)
Dept: OTOLARYNGOLOGY | Facility: CLINIC | Age: 45
End: 2025-07-17
Payer: COMMERCIAL

## 2025-07-17 ENCOUNTER — CLINICAL SUPPORT (OUTPATIENT)
Dept: AUDIOLOGY | Facility: CLINIC | Age: 45
End: 2025-07-17
Payer: COMMERCIAL

## 2025-07-17 DIAGNOSIS — H90.3 ASYMMETRICAL SENSORINEURAL HEARING LOSS: Primary | ICD-10-CM

## 2025-07-17 PROCEDURE — 92550 TYMPANOMETRY & REFLEX THRESH: CPT | Mod: 52

## 2025-07-17 PROCEDURE — 92557 COMPREHENSIVE HEARING TEST: CPT

## 2025-07-17 NOTE — PROGRESS NOTES
"ADULT AUDIOLOGY EVALUATION    Name:  Vladislav Gilliam \"Fide\"  :  1980  Age:  45 y.o.  Date of Evaluation:  2025     IMPRESSIONS     Today's test results indicate normal to moderate sensorineural hearing loss in the right ear, and mild to moderate sensorineural hearing loss in the left ear. Asymmetry is present at a majority of test frequencies, with greater hearing loss in the right ear. Tympanometry indicates normal middle ear functioning bilaterally. Word recognition is excellent in the right ear, and good in the left ear. Compared to previous audiologic evaluation on 24, hearing thresholds are largely stable.     RECOMMENDATIONS     Follow up with EZIO Roth regarding today's findings.  Pending medical clearance, consider amplification use. The audiology department can be reached at [phone] for a hearing aid consultation. Patient stated that he is not interested in amplification at this time.  Use of tinnitus management strategies such as low level background noise, keeping busy through pleasant activities, and avoidance of exacerbating factors such as alcohol, poor sleep, nicotine, caffeine, and stress.   Return for annual hearing evaluation or sooner should new concerns arise.    Time: 3853-8307    HISTORY     Vladislav Gilliam is seen today for an audiologic evaluation in conjunction with otolaryngology. Previous audiologic evaluation on 24 revealed normal to mild sensorineural hearing loss in the right ear, with with mild to moderate sensorineural hearing loss in the left ear. Tympanometry revealed normal middle ear function bilaterally.    Patient reports a longstanding history of left sided hearing loss since childhood. He remembers a hospital stay around the age of 6 with discovery of hearing loss, but could not recall exact etiology of loss or reason for hospitalization. His primary concern today is the onset of tinnitus several months ago. This may be " present in both ears, but is noticed more in the right, and is described as a distant higher pitched non-pulsatile ringing that is almost always present. Fide noted that he has been working on a stressful project recently and has been going to bed at late hours. He has noticed that tinnitus does not seem as noticeable after a full night of sleep. Patient also noted two instances of hearing opera music that was not coming from an external source. This occurred at 5 am both times.    He has noticed himself speaking loudly, but has not noticed a change in hearing. Fide noted a history of itchy ear canals and TMJ. He denied dizziness, but noted occasional headaches which he thinks may be attributed to caffeine and dehydration. He denied aural fullness, otalgia, and history of otologic surgeries.    TEST RESULTS     Otoscopic Evaluation:  Right Ear: Clear ear canal with unremarkable tympanic membrane  Left Ear: Clear ear canal with unremarkable tympanic membrane    Tympanometry:   Right Ear: Normal, type A tympanogram with normal ear canal volume, peak pressure and compliance.   Left Ear: Normal, type A tympanogram with normal ear canal volume, peak pressure and compliance.     Ipsilateral Acoustic Reflexes:   Right Ear: Present 500-4000 Hz.   Left Ear: Present 500-4000 Hz.     Pure Tone Audiometry (125-8000 Hz):     Right Ear: Normal hearing sensitivity from 125-2000 Hz, falling to mild to moderate sensorineural hearing loss from 4679-1612 Hz.    Left Ear: Mild to moderate sensorineural hearing loss from 125-8000 Hz.     Asymmetry present 125-2000 Hz, and 6956-3934 Hz (greater loss in left ear).    Speech Audiometry:   Right Ear:    Speech Reception Threshold (SRT) was obtained at 15 dBHL using recorded material.   Word Recognition scores were excellent (100%) in quiet when words were presented at 55 dBHL using recorded NU-6 word list ordered by difficulty.  Left Ear:    Speech Reception Threshold (SRT) was obtained at 40  dBHL using recorded material.   Word Recognition scores were good (84%) in quiet when words were presented at 70 dBHL using recorded NU-6 word list ordered by difficulty.     Previous Test:  Right ear: Hearing thresholds are largely stable compared to previous test from 11/22/24.  Left ear: Hearing thresholds are largely stable compared to previous test from 11/22/24.      PATIENT EDUCATION     Patient was counseled in regard to findings. We discussed relationship between hearing loss and tinnitus, as well as tinnitus exacerbation from several factors mentioned today including poor sleep, dehydration, and caffeine. Tinnitus management through use of low level background noise was recommended at times that tinnitus is bothersome. All questions were addressed at this time.        Vandana Valerio, Aixa, CCC-A  Clinical Audiologist    Degree of Hearing Sensitivity Decibel Range   Within Normal Limits (WNL) 0-25   Mild 26-40   Moderate 41-55   Moderately-Severe 56-70   Severe 71-90   Profound 91+      Key   CNT/DNT Could Not Test/Did Not Test   TM Tympanic Membrane   WNL Within Normal Limits   HA Hearing Aid   SNHL Sensorineural Hearing Loss   CHL Conductive Hearing Loss   NIHL Noise-Induced Hearing Loss   ECV Ear Canal Volume   MLV Monitored Live Voice     AUDIOGRAM

## 2025-07-22 ENCOUNTER — APPOINTMENT (OUTPATIENT)
Facility: CLINIC | Age: 45
End: 2025-07-22
Payer: COMMERCIAL

## 2025-08-06 NOTE — PROGRESS NOTES
History of Present Illness  Fide Gilliam is a 45 y.o. male is referred by his wife who is a  physician for evaluation of bilateral tinnitus. His  PMHx of LETY not CPAP compliant, TMJ, obesity, HTN presents today for surgical evaluation of left septal lesion and nasal airway obstruction.     The patient is here by himself. He says he has had hearing loss for a long time, ever since he was a child. He is unsure if there has been any progression of his hearing loss. He mentions that he has a long history picking his nose and ears and one day noted a foul smell coming from his ears. He also reports that a black discharge was seen. He subsequently developed ear discharge and was treated for it. He also notes ringing in the ears which is more persistent and worsening over the past few weeks. This somewhat resolved after he took antibiotics prescribed by his wife.  Patient mentions that he has been having a lot of stress related to his work for the past 2 months. He recalls that he has been going to bed at 5-6 am in the morning after work and only gets about 5 hours of sleep per night. He also has accompanying itching. Patient has TMJ issues and wears nightguard which he believes might also play a factor in his symptoms.     The approximate duration of the patient's complaint is weeks. They describe their tinnitus as worsening and persistent. It is constant. They do not describe it as pulsatile in nature. When asked whether the tinnitus interfered with the patient's daily activities or prevented the patient from falling asleep, the patient reports none.    They endorse hearing loss, ear discharge and tinnitus but deny ear pain, aural fullness and autophony. They also deny a history of prior ear surgery, noise exposure, exposure to ototoxic drugs or agents, and family history of hearing loss. They have had a recent audiogram, which is available for review.    The patient's current medications, active allergies and list  of medical problems were reviewed in the EHR and confirmed electronically there are as follows;    Allergies:   Allergies[1]    Current list of medications:   Current Medications[2]    Problem list:  Problem List[3]    Medical history:  Medical History[4]    Surgical history:  Surgical History[5]    Family history:  Family History[6]    Social history:  Social History[7]    Physical Examination:  CONSTITUTIONAL:  No acute distress  VOICE:  No hoarseness or other abnormality  RESPIRATION:  Breathing comfortably, no stridor  CV:  No clubbing/cyanosis/edema in hands  EYES:  EOM intact, sclera clear  NEURO:  Alert and oriented times 3, Cranial nerves II-XII grossly intact and symmetric bilaterally  HEAD AND FACE:  Symmetric facial features, no masses or lesions  RIGHT EAR:  Normal external ear and post auricular area, no visible lesions, external auditory canal patent, tympanic membrane intact, no retraction, no signs of mass, effusion, or infection within the middle ear  LEFT EAR: Normal external ear and post auricular area, no visible lesions, external auditory canal patent, tympanic membrane intact, no retraction, no signs of mass, effusion, or infection within the middle ear, dry ear canal, no cerumen, TM is intact  NOSE:  External appearance normal without an obvious deformity.  PHARYNGEAL WALLS: Normal lining without obvious masses.  NECK/LYMPH:  No LAD, no thyroid masses, trachea midline  SKIN:  Neck and facial skin is without scar or injury  PSYCH:  Alert and oriented with appropriate mood and affect    Diagnostic testing:    Audiometry:  Audiometry testing done on 7/17/2025 reveals:  On the right: normal sensitivity to moderate sensorineural hearing loss  On the left: borderline moderate to mild asymmetric sensorineural hearing loss  Speech discrimination is 100% on the right and 84% on the left. Type A tympanograms.     I personally reviewed the available patient's external record and independently reviewed  their audiometric testing [and] radiographic imaging through the appropriate viewing software as detailed in my note and agree with the detailed report.    Impression  Asymmetric sensorineural hearing loss  Left subjective tinnitus   Left dry ear   Chronic temporomandibular disorder    Recommendation:  At this point, there is no active infection and it is likely that his episode of drainage was related to the infection and resolved after the infection went away. As far as his tinnitus we discussed the etiology which could have been worsened by his hearing loss and ongoing stress  and TMJ issues.   In regards to his ongoing tinnitus, I recommended avoidance of caffeine and other stimulant beverages. I also recommended baby oil for his dry ear and also avoidance of q tips. In regards to his dry nos, he will use AYR gel    The audiogram was reviewed with the patient. The likely etiology of the tinnitus was reviewed. The various masking and distraction techniques were discussed. All questions were answered to the patient's satisfaction.      By signing my name below, I, Pawel Mayen attest that this documentation has been prepared under the direction and in the presence of Bj Bajwa MD          [1] No Known Allergies  [2]   Current Outpatient Medications   Medication Sig Dispense Refill    atorvastatin (Lipitor) 40 mg tablet TAKE 1 TABLET BY MOUTH EVERY DAY 90 tablet 3    betamethasone, augmented, (Diprolene AF) 0.05 % cream APPLY TO AFFECTED AREA TWICE A DAY 50 g 3    clobetasol (Temovate) 0.05 % external solution APPLY TO AFFECTED AREA TWICE A DAY FOR 14 DAYS 50 mL 3    cloNIDine (Catapres) 0.1 mg tablet Take 1 tablet (0.1 mg) by mouth once daily at bedtime. 30 tablet 3    fluocinolone (DermOtic) 0.01 % ear drops Administer 5 drops into each ear 2 times a day. 20 mL 1    fluticasone (Flonase) 50 mcg/actuation nasal spray Administer 2 sprays into each nostril once daily. Shake gently. Before first use,  prime pump. After use, clean tip and replace cap. 16 g 3    hydrOXYzine pamoate (Vistaril) 25 mg capsule Take 1 capsule (25 mg) by mouth 4 times a day as needed for itching or anxiety. (Patient taking differently: Take 1 capsule (25 mg) by mouth once daily.) 120 capsule 0    LORazepam (Ativan) 0.5 mg tablet TAKE 1 TABLET BY MOUTH 2 TIMES A DAY AS NEEDED FOR ANXIETY 60 tablet 0    mometasone furoate, bulk, 100 % powder Compound 1 mg capsule to be added to sinus rinse twice daily. 60 g 3    nortriptyline (Pamelor) 10 mg capsule TAKE 2 CAPSULES BY MOUTH AT 9 PM (Patient taking differently: Take 1 capsule (10 mg) by mouth once daily.)      tiZANidine (Zanaflex) 4 mg tablet Take 1 tablet (4 mg) by mouth once daily at bedtime. 90 tablet 1     No current facility-administered medications for this visit.   [3]   Patient Active Problem List  Diagnosis    Adjustment disorder with mixed anxiety and depressed mood    Anxiety and depression    Elevated blood pressure reading    Enlarged sublingual gland    Hearing loss    Hemangioma of skin and subcutaneous tissue    Other hypertrophic disorders of the skin    Skin lesions    Scar condition and fibrosis of skin    HLD (hyperlipidemia)    Hyposmia    Insomnia    Male hypogonadism    Melanocytic nevi of left upper limb, including shoulder    Melanocytic nevi of unspecified lower limb, including hip    Melanocytic nevi of trunk    Memory loss    Nasal septal deviation    Nasal turbinate hypertrophy    Nasal valve collapse    Neoplasm of uncertain behavior of skin    Nephrolithiasis    Onychodystrophy    Other melanin hyperpigmentation    Other seborrheic keratosis    Seborrheic dermatitis, unspecified    Skin nodule    Sleep apnea    Sinus tachycardia    Vitamin D deficiency    Class 1 obesity with body mass index (BMI) of 30.0 to 30.9 in adult    Heaviness of upper extremity    Agatston CAC score 200-399    Nonsmoker    Pain of left upper extremity    Shortness of breath on  exertion    Abnormal electrocardiography    History of hypertension    Routine general medical examination at a health care facility    Need for influenza vaccination    BMI 31.0-31.9,adult    Hyperglycemia    Well adult health check    Tinnitus of right ear   [4]   Past Medical History:  Diagnosis Date    Anxiety     Depression     Hyperlipidemia     Panic attack 2024    Personal history of other diseases of the circulatory system     History of essential hypertension    Personal history of urinary calculi 05/04/2021    History of nephrolithiasis    Sleep difficulties 2024    Unspecified chronic otitis externa, unspecified ear 06/15/2021    Chronic otitis externa   [5]   Past Surgical History:  Procedure Laterality Date    OTHER SURGICAL HISTORY  05/04/2021    No history of surgery   [6]   Family History  Problem Relation Name Age of Onset    Colon cancer Mother      Aortic stenosis Father      Epilepsy Father      Depression Mother Ange Gliliam     Seizures Father Hermelindo Katsnelson     Cancer Mother Ange Osheaizzyon     Diabetes Maternal Grandfather Jose Guadalupe Madrigal     Heart failure Maternal Grandmother Tamiok Kaitlin    [7]   Social History  Tobacco Use    Smoking status: Never     Passive exposure: Never    Smokeless tobacco: Never   Substance Use Topics    Alcohol use: Never    Drug use: Never

## 2025-08-08 ENCOUNTER — APPOINTMENT (OUTPATIENT)
Facility: CLINIC | Age: 45
End: 2025-08-08
Payer: COMMERCIAL

## 2025-08-08 VITALS — BODY MASS INDEX: 31.83 KG/M2 | HEIGHT: 68 IN | WEIGHT: 210 LBS

## 2025-08-08 DIAGNOSIS — H90.3 ASYMMETRICAL SENSORINEURAL HEARING LOSS: Primary | ICD-10-CM

## 2025-08-08 DIAGNOSIS — M26.623 BILATERAL TEMPOROMANDIBULAR JOINT PAIN: ICD-10-CM

## 2025-08-08 DIAGNOSIS — H93.12 SUBJECTIVE TINNITUS, LEFT: ICD-10-CM

## 2025-08-08 ASSESSMENT — PATIENT HEALTH QUESTIONNAIRE - PHQ9
SUM OF ALL RESPONSES TO PHQ9 QUESTIONS 1 AND 2: 0
2. FEELING DOWN, DEPRESSED OR HOPELESS: NOT AT ALL
1. LITTLE INTEREST OR PLEASURE IN DOING THINGS: NOT AT ALL

## 2025-08-08 NOTE — PATIENT INSTRUCTIONS
Addended by: ANNA HERCULES on: 1/20/2021 08:38 AM     Modules accepted: Orders     Tinnitus Information:    BACKGROUND   This plain language summary serves as an overview in explaining tinnitus and managing its symptoms. Tinnitus is a sensation of noise or ringing in the ears or head, when there is no real sound. Tinnitus (pronounced ten / ih / tus) affects 10-15% of adults in the [place]. Some people experience tinnitus that goes away on its own. Other people have symptoms that last 6 months or longer and interfere with their life. The information in this summary is based on the 2014 Clinical Practice Guideline: Tinnitus. The guideline includes evidence-based research to support more effective diagnosis and treatment of tinnitus.     WHAT IS TINNITUS? Tinnitus can be heard in one or both sides of the head. The noises can sound like they are either from within or outside the head. Tinnitus sounds can include ringing, roaring, buzzing, clicking, beating, whooshing, whistling, humming, or other noises. The person may 'hear' their tinnitus all the time, or only in certain situations. Tinnitus can hurt a person's quality of life. Patients may experience symptoms at different levels of severity. Common patient complaints include difficulty sleeping, struggling to understand other's speech, depression, and problems focusing. These experiences could lead to problems with both work and family life.     WHAT CAUSES TINNITUS? ARE THERE RISK FACTORS? There are two types of tinnitus: Primary and Secondary. Primary tinnitus has an unknown cause. It may or may not be linked with hearing loss. Secondary tinnitus has a specific known cause. It may be such things like impacted earwax, or diseases or pressure behind the eardrum. Secondary tinnitus can also be related to Meniere's disease or ear nerve conditions. Tinnitus can be caused by more unusual or serious conditions. Some of these rare conditions include tumors, heart problems, or blood vessel problems. Tinnitus can be seen at any age, in males or  females, and in all ethnic groups. Tinnitus occurs more frequently in males, the elderly and non- whites. There is a higher rate of tinnitus among  veterans. Tinnitus is also more likely to occur in people who are overweight, obese, or who have high blood pressure. Other risk factors include diabetes, high cholesterol, or anxiety disorder. Tinnitus is believed to be linked to long-term noise exposure. Exposure to noise, such as firearms or loud music, is also a risk factor.     WHAT CAN YOU DO? You should seek medical care after you notice symptoms which may help avoid misdiagnosis or delayed diagnosis. Tinnitus can be very upsetting, and it can even be associated with depression and anxiety. Tell your doctor if you are having a strong emotional response to your tinnitus. Tinnitus patients commonly have trouble sleeping (insomnia). Lack of sleep can reduce the ability to pay attention. It can also lead to anger, frustration, and other negative emotions. Some patients develop a fear of being in noisy places. It is important to tell your doctor if symptoms are affecting your daily life.  HOW IS TINNITUS DIAGNOSED? A doctor can diagnose tinnitus by reviewing your medical history and performing a physical exam. The examination may rule out other conditions. A doctor may look in the ears using a device called an otoscope (pronounced oh/ toe/ scope). This device allows for a good view inside the ear canal. Your doctor may also find other treatable conditions that are causing tinnitus. For example, earwax that is obstructing the ear canal can be removed. Fluid behind the eardrum can also be treated. Tinnitus frequently occurs in patients with hearing loss. Hearing tests are often done for people who have tinnitus.     WHAT TREATMENTS ARE AVAILABLE?   Tinnitus may improve on its own, especially when it is mild and has lasted for less than 6 months. When treatment is needed, patients benefit from  individualized treatments to help manage their symptoms. Hearing aids can improve a patient's quality of life by correcting any hearing loss. Hearing aids can also make the tinnitus less noticeable. Patients who have upsetting tinnitus may benefit from counseling and/or medications. Cognitive behavioral therapy is a form of psychotherapy that may be helpful for patients trying to cope with upsetting tinnitus. Sound therapy is sometimes a good option. Smart phones, CD players, MP3 players, and radios can be used for sound therapy.   Studies show that products such as Ginkgo biloba, melatonin, or zinc do not help patients with tinnitus. Therefore, these types of over-the-counter products are not recommended for treatment. Research shows that injected ear medications injected through the eardrum do not improve tinnitus. Studies also prove that treatments with magnetic stimulation do not improve the tinnitus. Therefore, these treatments are not recommended. There is not enough evidence to either recommend or discourage using acupuncture for treating tinnitus.

## 2025-08-12 ENCOUNTER — APPOINTMENT (OUTPATIENT)
Dept: AUDIOLOGY | Facility: CLINIC | Age: 45
End: 2025-08-12
Payer: COMMERCIAL

## 2025-08-12 ENCOUNTER — APPOINTMENT (OUTPATIENT)
Dept: OTOLARYNGOLOGY | Facility: CLINIC | Age: 45
End: 2025-08-12
Payer: COMMERCIAL

## 2025-08-12 ENCOUNTER — APPOINTMENT (OUTPATIENT)
Facility: CLINIC | Age: 45
End: 2025-08-12
Payer: COMMERCIAL

## 2025-08-14 ENCOUNTER — APPOINTMENT (OUTPATIENT)
Facility: CLINIC | Age: 45
End: 2025-08-14
Payer: COMMERCIAL

## 2025-09-09 ENCOUNTER — APPOINTMENT (OUTPATIENT)
Dept: PRIMARY CARE | Facility: CLINIC | Age: 45
End: 2025-09-09
Payer: COMMERCIAL

## 2025-09-11 ENCOUNTER — APPOINTMENT (OUTPATIENT)
Dept: PRIMARY CARE | Facility: CLINIC | Age: 45
End: 2025-09-11
Payer: COMMERCIAL

## 2025-09-29 ENCOUNTER — APPOINTMENT (OUTPATIENT)
Dept: DERMATOLOGY | Facility: CLINIC | Age: 45
End: 2025-09-29
Payer: COMMERCIAL

## 2025-10-27 ENCOUNTER — APPOINTMENT (OUTPATIENT)
Dept: CARDIOLOGY | Facility: CLINIC | Age: 45
End: 2025-10-27
Payer: COMMERCIAL

## 2025-10-28 ENCOUNTER — APPOINTMENT (OUTPATIENT)
Dept: CARDIOLOGY | Facility: CLINIC | Age: 45
End: 2025-10-28
Payer: COMMERCIAL

## 2025-11-03 ENCOUNTER — APPOINTMENT (OUTPATIENT)
Dept: PRIMARY CARE | Facility: CLINIC | Age: 45
End: 2025-11-03
Payer: COMMERCIAL